# Patient Record
Sex: MALE | Race: WHITE | NOT HISPANIC OR LATINO | Employment: FULL TIME | URBAN - METROPOLITAN AREA
[De-identification: names, ages, dates, MRNs, and addresses within clinical notes are randomized per-mention and may not be internally consistent; named-entity substitution may affect disease eponyms.]

---

## 2017-05-01 ENCOUNTER — APPOINTMENT (OUTPATIENT)
Dept: RADIOLOGY | Facility: MEDICAL CENTER | Age: 32
End: 2017-05-01
Attending: EMERGENCY MEDICINE
Payer: MEDICAID

## 2017-05-01 ENCOUNTER — HOSPITAL ENCOUNTER (EMERGENCY)
Facility: MEDICAL CENTER | Age: 32
End: 2017-05-01
Attending: EMERGENCY MEDICINE
Payer: MEDICAID

## 2017-05-01 VITALS
HEIGHT: 68 IN | RESPIRATION RATE: 18 BRPM | HEART RATE: 75 BPM | OXYGEN SATURATION: 97 % | DIASTOLIC BLOOD PRESSURE: 79 MMHG | TEMPERATURE: 98 F | SYSTOLIC BLOOD PRESSURE: 116 MMHG | BODY MASS INDEX: 25.66 KG/M2 | WEIGHT: 169.31 LBS

## 2017-05-01 DIAGNOSIS — K29.70 GASTRITIS WITHOUT BLEEDING, UNSPECIFIED CHRONICITY, UNSPECIFIED GASTRITIS TYPE: ICD-10-CM

## 2017-05-01 LAB
ALBUMIN SERPL BCP-MCNC: 4.7 G/DL (ref 3.2–4.9)
ALBUMIN/GLOB SERPL: 1.4 G/DL
ALP SERPL-CCNC: 67 U/L (ref 30–99)
ALT SERPL-CCNC: 17 U/L (ref 2–50)
ANION GAP SERPL CALC-SCNC: 11 MMOL/L (ref 0–11.9)
AST SERPL-CCNC: 34 U/L (ref 12–45)
BASOPHILS # BLD AUTO: 0.3 % (ref 0–1.8)
BASOPHILS # BLD: 0.04 K/UL (ref 0–0.12)
BILIRUB SERPL-MCNC: 0.8 MG/DL (ref 0.1–1.5)
BUN SERPL-MCNC: 10 MG/DL (ref 8–22)
CALCIUM SERPL-MCNC: 9.6 MG/DL (ref 8.5–10.5)
CHLORIDE SERPL-SCNC: 105 MMOL/L (ref 96–112)
CO2 SERPL-SCNC: 24 MMOL/L (ref 20–33)
CREAT SERPL-MCNC: 0.88 MG/DL (ref 0.5–1.4)
EOSINOPHIL # BLD AUTO: 0.03 K/UL (ref 0–0.51)
EOSINOPHIL NFR BLD: 0.2 % (ref 0–6.9)
ERYTHROCYTE [DISTWIDTH] IN BLOOD BY AUTOMATED COUNT: 40.9 FL (ref 35.9–50)
GFR SERPL CREATININE-BSD FRML MDRD: >60 ML/MIN/1.73 M 2
GLOBULIN SER CALC-MCNC: 3.3 G/DL (ref 1.9–3.5)
GLUCOSE SERPL-MCNC: 95 MG/DL (ref 65–99)
HCT VFR BLD AUTO: 49.6 % (ref 42–52)
HGB BLD-MCNC: 17.5 G/DL (ref 14–18)
IMM GRANULOCYTES # BLD AUTO: 0.03 K/UL (ref 0–0.11)
IMM GRANULOCYTES NFR BLD AUTO: 0.2 % (ref 0–0.9)
LIPASE SERPL-CCNC: 23 U/L (ref 11–82)
LYMPHOCYTES # BLD AUTO: 2.51 K/UL (ref 1–4.8)
LYMPHOCYTES NFR BLD: 19.5 % (ref 22–41)
MCH RBC QN AUTO: 30.1 PG (ref 27–33)
MCHC RBC AUTO-ENTMCNC: 35.3 G/DL (ref 33.7–35.3)
MCV RBC AUTO: 85.4 FL (ref 81.4–97.8)
MONOCYTES # BLD AUTO: 0.87 K/UL (ref 0–0.85)
MONOCYTES NFR BLD AUTO: 6.8 % (ref 0–13.4)
NEUTROPHILS # BLD AUTO: 9.38 K/UL (ref 1.82–7.42)
NEUTROPHILS NFR BLD: 73 % (ref 44–72)
NRBC # BLD AUTO: 0 K/UL
NRBC BLD AUTO-RTO: 0 /100 WBC
PLATELET # BLD AUTO: 235 K/UL (ref 164–446)
PMV BLD AUTO: 10 FL (ref 9–12.9)
POTASSIUM SERPL-SCNC: 4.8 MMOL/L (ref 3.6–5.5)
PROT SERPL-MCNC: 8 G/DL (ref 6–8.2)
RBC # BLD AUTO: 5.81 M/UL (ref 4.7–6.1)
SODIUM SERPL-SCNC: 140 MMOL/L (ref 135–145)
WBC # BLD AUTO: 12.9 K/UL (ref 4.8–10.8)

## 2017-05-01 PROCEDURE — 96374 THER/PROPH/DIAG INJ IV PUSH: CPT

## 2017-05-01 PROCEDURE — 700111 HCHG RX REV CODE 636 W/ 250 OVERRIDE (IP): Performed by: EMERGENCY MEDICINE

## 2017-05-01 PROCEDURE — 85025 COMPLETE CBC W/AUTO DIFF WBC: CPT

## 2017-05-01 PROCEDURE — 74022 RADEX COMPL AQT ABD SERIES: CPT

## 2017-05-01 PROCEDURE — 99285 EMERGENCY DEPT VISIT HI MDM: CPT

## 2017-05-01 PROCEDURE — 96375 TX/PRO/DX INJ NEW DRUG ADDON: CPT

## 2017-05-01 PROCEDURE — A9270 NON-COVERED ITEM OR SERVICE: HCPCS | Performed by: EMERGENCY MEDICINE

## 2017-05-01 PROCEDURE — 96376 TX/PRO/DX INJ SAME DRUG ADON: CPT

## 2017-05-01 PROCEDURE — 700102 HCHG RX REV CODE 250 W/ 637 OVERRIDE(OP): Performed by: EMERGENCY MEDICINE

## 2017-05-01 PROCEDURE — 80053 COMPREHEN METABOLIC PANEL: CPT

## 2017-05-01 PROCEDURE — 83690 ASSAY OF LIPASE: CPT

## 2017-05-01 RX ORDER — OMEPRAZOLE 20 MG/1
20 CAPSULE, DELAYED RELEASE ORAL DAILY
Qty: 30 CAP | Refills: 0 | Status: SHIPPED | OUTPATIENT
Start: 2017-05-01 | End: 2017-11-04

## 2017-05-01 RX ORDER — OMEPRAZOLE 20 MG/1
20 CAPSULE, DELAYED RELEASE ORAL 2 TIMES DAILY
Qty: 60 CAP | Refills: 0 | Status: SHIPPED | OUTPATIENT
Start: 2017-05-01 | End: 2017-05-01

## 2017-05-01 RX ORDER — MORPHINE SULFATE 4 MG/ML
2 INJECTION, SOLUTION INTRAMUSCULAR; INTRAVENOUS ONCE
Status: COMPLETED | OUTPATIENT
Start: 2017-05-01 | End: 2017-05-01

## 2017-05-01 RX ORDER — MORPHINE SULFATE 4 MG/ML
4 INJECTION, SOLUTION INTRAMUSCULAR; INTRAVENOUS ONCE
Status: COMPLETED | OUTPATIENT
Start: 2017-05-01 | End: 2017-05-01

## 2017-05-01 RX ORDER — ONDANSETRON 2 MG/ML
4 INJECTION INTRAMUSCULAR; INTRAVENOUS ONCE
Status: COMPLETED | OUTPATIENT
Start: 2017-05-01 | End: 2017-05-01

## 2017-05-01 RX ORDER — SUCRALFATE 1 G/1
1 TABLET ORAL
Qty: 120 TAB | Refills: 0 | Status: SHIPPED | OUTPATIENT
Start: 2017-05-01 | End: 2017-05-31

## 2017-05-01 RX ADMIN — MORPHINE SULFATE 4 MG: 4 INJECTION INTRAVENOUS at 20:26

## 2017-05-01 RX ADMIN — ONDANSETRON 4 MG: 2 INJECTION INTRAMUSCULAR; INTRAVENOUS at 20:26

## 2017-05-01 RX ADMIN — MORPHINE SULFATE 2 MG: 4 INJECTION INTRAVENOUS at 21:52

## 2017-05-01 RX ADMIN — LIDOCAINE HYDROCHLORIDE 30 ML: 20 SOLUTION OROPHARYNGEAL at 21:28

## 2017-05-01 ASSESSMENT — PAIN SCALES - GENERAL: PAINLEVEL_OUTOF10: 8

## 2017-05-01 NOTE — ED AVS SNAPSHOT
[employer] Access Code: [email or web address]  Expires: 5/31/2017 10:01 PM    [employer]  A secure, online tool to manage your health information     [employer]® is a secure, online tool that connects you to your personalized health information from the privacy of your home -- day or night - making it very easy for you to manage your healthcare. Once the activation process is completed, you can even access your medical information using the [employer] tha, which is available for free in the Apple Tha store or Google Play store.     [employer] provides the following levels of access (as shown below):   My Chart Features   [facility] Primary Care Doctor [facility]  Specialists [facility]  Urgent  Care Non-[facility]  Primary Care  Doctor   Email your healthcare team securely and privately 24/7 X X X X   Manage appointments: schedule your next appointment; view details of past/upcoming appointments X      Request prescription refills. X      View recent personal medical records, including lab and immunizations X X X X   View health record, including health history, allergies, medications X X X X   Read reports about your outpatient visits, procedures, consult and ER notes X X X X   See your discharge summary, which is a recap of your hospital and/or ER visit that includes your diagnosis, lab results, and care plan. X X       How to register for [employer]  1. Go to  [email or web address].  2. Click on the Sign Up Now box, which takes you to the New Member Sign Up page. You will need to provide the following information:  a. Enter your [employer] Access Code exactly as it appears at the top of this page. (You will not need to use this code after you’ve completed the sign-up process. If you do not sign up before the expiration date, you must request a new code.)   b. Enter your date of birth.   c. Enter your home email address.   d. Click Submit, and follow the next screen’s instructions.  3. Create a [employer] ID. This will be your [employer]  login ID and cannot be changed, so think of one that is secure and easy to remember.  4. Create a [employer] password. You can change your password at any time.  5. Enter your Password Reset Question and Answer. This can be used at a later time if you forget your password.   6. Enter your e-mail address. This allows you to receive e-mail notifications when new information is available in [employer].  7. Click Sign Up. You can now view your health information.    For assistance activating your [employer] account, call [phone]

## 2017-05-01 NOTE — ED AVS SNAPSHOT
5/1/2017    Bryant Mix  [address]  [place] [address]    Dear Bryant:    [facility] wants to ensure your discharge home is safe and you or your loved ones have had all of your questions answered regarding your care after you leave the hospital.    Below is a list of resources and contact information should you have any questions regarding your hospital stay, follow-up instructions, or active medical symptoms.    Questions or Concerns Regarding… Contact   Medical Questions Related to Your Discharge  (7 days a week, 8am-5pm) Contact a Nurse Care Coordinator   [phone]   Medical Questions Not Related to Your Discharge  (24 hours a day / 7 days a week)  Contact the Nurse Health Line   [phone]    Medications or Discharge Instructions Refer to your discharge packet   or contact your [facility] Primary Care Provider   [phone]   Follow-up Appointment(s) Schedule your appointment via [employer]Smart   or contact Scheduling [phone]   Billing Review your statement via [employer]  or contact Billing [phone]   Medical Records Review your records via [employer]   or contact Medical Records [phone]     You may receive a telephone call within two days of discharge. This call is to make certain you understand your discharge instructions and have the opportunity to have any questions answered. You can also easily access your medical information, test results and upcoming appointments via the [employer] free online health management tool. You can learn more and sign up at [email or web address]rSmart. For assistance setting up your [employer] account, please call [phone].    Once again, we want to ensure your discharge home is safe and that you have a clear understanding of any next steps in your care. If you have any questions or concerns, please do not hesitate to contact us, we are here for you. Thank you for choosing [facility] for your healthcare needs.    Sincerely,    Your [facility] Healthcare Team

## 2017-05-01 NOTE — ED AVS SNAPSHOT
Home Care Instructions                                                                                                                Bryant Mix   MRN: [id number]    Department:  [facility], Emergency Dept   Date of Visit:  5/1/2017            [facility] Emergency Dept    [address] [facility] [address]    Phone:  [phone]      You were seen by     Jackson Ortez M.D.      Your Diagnosis Was     Gastritis without bleeding, unspecified chronicity, unspecified gastritis type     K29.70       These are the medications you received during your hospitalization from 05/01/2017 1633 to 05/01/2017 2201     Date/Time Order Dose Route Action    05/01/2017 2026 morphine (pf) 4 mg/ml injection 4 mg 4 mg Intravenous Given    05/01/2017 2026 ondansetron (ZOFRAN) syringe/vial injection 4 mg 4 mg Intravenous Given    05/01/2017 2128 hyoscyamine-maalox plus-lidocaine viscous (GI COCKTAIL) oral susp 30 mL 30 mL Oral Given    05/01/2017 2152 morphine (pf) 4 mg/ml injection 2 mg 2 mg Intravenous Given      Follow-up Information     1. Schedule an appointment as soon as possible for a visit with [facility].    Contact information    [address] [place] [address]  [phone]        2. Schedule an appointment as soon as possible for a visit with [facility].    Contact information    [facility] [address] [phone]          3. Follow up with [facility], Emergency Dept.    Specialty:  Emergency Medicine    Why:  in 12-24 hours if symptoms persist,, immediately if symptoms worsen    Contact information    [address] [place] [address] [phone]      Medication Information     Review all of your home medications and newly ordered medications with your primary doctor and/or pharmacist as soon as possible. Follow medication instructions as directed by your doctor and/or pharmacist.     Please keep your  complete medication list with you and share with your physician. Update the information when medications are discontinued, doses are changed, or new medications (including over-the-counter products) are added; and carry medication information at all times in the event of emergency situations.               Medication List      START taking these medications        Instructions    Morning Afternoon Evening Bedtime    omeprazole 20 MG delayed-release capsule   Commonly known as:  PRILOSEC        Take 1 Cap by mouth every day.   Dose:  20 mg                        sucralfate 1 GM Tabs   Commonly known as:  CARAFATE        Take 1 Tab by mouth 4 Times a Day,Before Meals and at Bedtime for 30 days.   Dose:  1 g                          ASK your doctor about these medications        Instructions    Morning Afternoon Evening Bedtime    albuterol 108 (90 BASE) MCG/ACT Aers inhalation aerosol        Inhale 2 Puffs by mouth every 6 hours as needed for Shortness of Breath.   Dose:  2 Puff                        cyclobenzaprine 5 MG tablet   Commonly known as:  FLEXERIL        Take 1-2 Tabs by mouth 3 times a day as needed for Muscle Spasms.   Dose:  5-10 mg                        gabapentin 300 MG Caps   Commonly known as:  NEURONTIN        Take 1 Cap by mouth 3 times a day.   Dose:  300 mg                        oxycodone-acetaminophen 5-325 MG Tabs   Commonly known as:  PERCOCET        Take 1-2 Tabs by mouth every 6 hours as needed for Moderate Pain (pain).   Dose:  1-2 Tab                        phenytoin  MG Caps   Commonly known as:  DILANTIN        Take 1 Cap by mouth 3 times a day.   Dose:  100 mg                        tramadol 50 MG Tabs   Commonly known as:  ULTRAM        Take 1-2 Tabs by mouth every 8 hours as needed.   Dose:   mg                             Where to Get Your Medications      You can get these medications from any pharmacy     Bring a paper prescription for each of these medications    -  omeprazole 20 MG delayed-release capsule  - sucralfate 1 GM Tabs            Procedures and tests performed during your visit     CARDIAC MONITORING    CBC WITH DIFFERENTIAL    COMP METABOLIC PANEL    DX-ABDOMEN COMPLETE WITH AP OR PA CXR    ESTIMATED GFR    LIPASE    O2 Protocol    SALINE LOCK        Discharge Instructions       Gastritis, Adult  Gastritis is soreness and swelling (inflammation) of the lining of the stomach. Gastritis can develop as a sudden onset (acute) or long-term (chronic) condition. If gastritis is not treated, it can lead to stomach bleeding and ulcers.  CAUSES   Gastritis occurs when the stomach lining is weak or damaged. Digestive juices from the stomach then inflame the weakened stomach lining. The stomach lining may be weak or damaged due to viral or bacterial infections. One common bacterial infection is the Helicobacter pylori infection. Gastritis can also result from excessive alcohol consumption, taking certain medicines, or having too much acid in the stomach.   SYMPTOMS   In some cases, there are no symptoms. When symptoms are present, they may include:  · Pain or a burning sensation in the upper abdomen.  · Nausea.  · Vomiting.  · An uncomfortable feeling of fullness after eating.  DIAGNOSIS   Your caregiver may suspect you have gastritis based on your symptoms and a physical exam. To determine the cause of your gastritis, your caregiver may perform the following:  · Blood or stool tests to check for the H pylori bacterium.  · Gastroscopy. A thin, flexible tube (endoscope) is passed down the esophagus and into the stomach. The endoscope has a light and camera on the end. Your caregiver uses the endoscope to view the inside of the stomach.  · Taking a tissue sample (biopsy) from the stomach to examine under a microscope.  TREATMENT   Depending on the cause of your gastritis, medicines may be prescribed. If you have a bacterial infection, such as an H pylori infection, antibiotics  may be given. If your gastritis is caused by too much acid in the stomach, H2 blockers or antacids may be given. Your caregiver may recommend that you stop taking aspirin, ibuprofen, or other nonsteroidal anti-inflammatory drugs (NSAIDs).  HOME CARE INSTRUCTIONS  · Only take over-the-counter or prescription medicines as directed by your caregiver.  · If you were given antibiotic medicines, take them as directed. Finish them even if you start to feel better.  · Drink enough fluids to keep your urine clear or pale yellow.  · Avoid foods and drinks that make your symptoms worse, such as:  1. Caffeine or alcoholic drinks.  2. Chocolate.  3. Peppermint or mint flavorings.  4. Garlic and onions.  5. Spicy foods.  6. Citrus fruits, such as oranges, hans, or limes.  7. Tomato-based foods such as sauce, chili, salsa, and pizza.  8. Fried and fatty foods.  · Eat small, frequent meals instead of large meals.  SEEK IMMEDIATE MEDICAL CARE IF:   · You have black or dark red stools.  · You vomit blood or material that looks like coffee grounds.  · You are unable to keep fluids down.  · Your abdominal pain gets worse.  · You have a fever.  · You do not feel better after 1 week.  · You have any other questions or concerns.  MAKE SURE YOU:  · Understand these instructions.  · Will watch your condition.  · Will get help right away if you are not doing well or get worse.     This information is not intended to replace advice given to you by your health care provider. Make sure you discuss any questions you have with your health care provider.     Document Released: 12/12/2002 Document Revised: 06/18/2013 Document Reviewed: 01/30/2013  [employer] Interactive Patient Education ©2016 [employer]  Abdominal Pain (Nonspecific)  Your exam might not show the exact reason you have abdominal pain. Since there are many different causes of abdominal pain, another checkup and more tests may be needed. It is very important to follow up for  lasting (persistent) or worsening symptoms. A possible cause of abdominal pain in any person who still has his or her appendix is acute appendicitis. Appendicitis is often hard to diagnose. Normal blood tests, urine tests, ultrasound, and CT scans do not completely rule out early appendicitis or other causes of abdominal pain. Sometimes, only the changes that happen over time will allow appendicitis and other causes of abdominal pain to be determined. Other potential problems that may require surgery may also take time to become more apparent. Because of this, it is important that you follow all of the instructions below.  HOME CARE INSTRUCTIONS   · Rest as much as possible.   · Do not eat solid food until your pain is gone.   · While adults or children have pain: A diet of water, weak decaffeinated tea, broth or bouillon, gelatin, oral rehydration solutions (ORS), frozen ice pops, or ice chips may be helpful.   · When pain is gone in adults or children: Start a light diet (dry toast, crackers, applesauce, or white rice). Increase the diet slowly as long as it does not bother you. Eat no dairy products (including cheese and eggs) and no spicy, fatty, fried, or high-fiber foods.   · Use no alcohol, caffeine, or cigarettes.   · Take your regular medicines unless your caregiver told you not to.   · Take any prescribed medicine as directed.   · Only take over-the-counter or prescription medicines for pain, discomfort, or fever as directed by your caregiver. Do not give aspirin to children.   If your caregiver has given you a follow-up appointment, it is very important to keep that appointment. Not keeping the appointment could result in a permanent injury and/or lasting (chronic) pain and/or disability. If there is any problem keeping the appointment, you must call to reschedule.   SEEK IMMEDIATE MEDICAL CARE IF:   · Your pain is not gone in 24 hours.   · Your pain becomes worse, changes location, or feels different.      · You or your child has an oral temperature above 102° F (38.9° C), not controlled by medicine.   · Your baby is older than 3 months with a rectal temperature of 102° F (38.9° C) or higher.   · Your baby is 3 months old or younger with a rectal temperature of 100.4° F (38° C) or higher.   · You have shaking chills.   · You keep throwing up (vomiting) or cannot drink liquids.   · There is blood in your vomit or you see blood in your bowel movements.   · Your bowel movements become dark or black.   · You have frequent bowel movements.   · Your bowel movements stop (become blocked) or you cannot pass gas.   · You have bloody, frequent, or painful urination.   · You have yellow discoloration in the skin or whites of the eyes.   · Your stomach becomes bloated or bigger.   · You have dizziness or fainting.   · You have chest or back pain.   MAKE SURE YOU:   · Understand these instructions.   · Will watch your condition.   · Will get help right away if you are not doing well or get worse.   Document Released: 12/18/2006 Document Revised: 03/11/2013 Document Reviewed: 11/15/2010  [employer]® Patient Information ©2013 [employer].          Patient Information     Patient Information    Following emergency treatment: all patient requiring follow-up care must return either to a private physician or a clinic if your condition worsens before you are able to obtain further medical attention, please return to the emergency room.     Billing Information    At [facility], we work to make the billing process streamlined for our patients.  Our Representatives are here to answer any questions you may have regarding your hospital bill.  If you have insurance coverage and have supplied your insurance information to us, we will submit a claim to your insurer on your behalf.  Should you have any questions regarding your bill, we can be reached online or by phone as follows:  Online: You are able pay your bills online or live chat with  our representatives about any billing questions you may have. We are here to help Monday - Friday from 8:00am to 7:30pm and 9:00am - 12:00pm on Saturdays.  Please visit [email or web address]  for more information.   Phone:  [phone] or [phone]    Please note that your emergency physician, surgeon, pathologist, radiologist, anesthesiologist, and other specialists are not employed by [facility] and will therefore bill separately for their services.  Please contact them directly for any questions concerning their bills at the numbers below:     [facility] Physician Services:  [phone]  [facility]:  [phone]  [facility]:  [phone]  [facility]:  [phone]    1. Your final bill may vary from the amount quoted upon discharge if all procedures are not complete at that time, or if your doctor has additional procedures of which we are not aware. You will receive an additional bill if you return to the Emergency Department at [facility] for suture removal regardless of the facility of which the sutures were placed.     2. Please arrange for settlement of this account at the emergency registration.    3. All self-pay accounts are due in full at the time of treatment.  If you are unable to meet this obligation then payment is expected within 4-5 days.     4. If you have had radiology studies (CT, X-ray, Ultrasound, MRI), you have received a preliminary result during your emergency department visit. Please contact the radiology department [phone] to receive a copy of your final result. Please discuss the Final result with your primary physician or with the follow up physician provided.     Crisis Hotline:  [place] Crisis Hotline:  1-262-XLAMMTV or 1-947.490.3680  [place] Crisis Hotline:    1-171.360.6923 or 703-588-4647         ED Discharge Follow Up Questions    1. In order to provide you with very good care, we  would like to follow up with a phone call in the next few days.  May we have your permission to contact you?     YES /  NO    2. What is the best phone number to call you? (       )_____-__________    3. What is the best time to call you?      Morning  /  Afternoon  /  Evening                   Patient Signature:  ____________________________________________________________    Date:  ____________________________________________________________

## 2017-05-02 NOTE — ED PROVIDER NOTES
"ED Provider Note    Scribed for Jackson Ortez M.D. by Belkys Cho. 5/1/2017, 8:10 PM.    Primary care provider: Luther Linton M.D.  Means of arrival: Walk-in  History obtained from: Patient  History limited by: None    CHIEF COMPLAINT  No chief complaint on file.      HPI  Bryant Mix is a 31 y.o. male who presents to the Emergency Department with epigastric pain for three days. Patient states his pain became severe at 12:00 PM today and has been progressively worsening throughout the day. He describes his pain as \"something pulling my stomach\" and he reports associated nausea. Patient had an episode of emesis after arriving to the emergency department. He denies diarrhea but states his stool has been soft and yellowish in color. Patient denies fever, chills, constipation, blood in stool or urine, or other symptoms. He has a history of gastric ulcers and seizures.     REVIEW OF SYSTEMS  Pertinent positives include epigastric pain, nausea, vomiting. Pertinent negatives include no diarrhea, fever, chills,  constipation, blood in stool or urine. Otherwise ten systems reviewed and otherwise negative. C.    PAST MEDICAL HISTORY   has a past medical history of Psychiatric disorder; Hypoglycemia; Lactose intolerance; Seizure disorder (CMS-[facility]); and Spina bifida (CMS-HCC).    SURGICAL HISTORY   has past surgical history that includes other; tonsillectomy; and frenulum clipping.    SOCIAL HISTORY  Social History   Substance Use Topics   • Smoking status: Current Every Day Smoker -- 0.25 packs/day for 6 years     Types: Cigarettes   • Smokeless tobacco: Current User     Types: Chew   • Alcohol Use: Yes      Comment: socially      History   Drug Use   • Yes     Comment: marijuana       FAMILY HISTORY  Non-Contributory    CURRENT MEDICATIONS  Reviewed.  See Encounter Summary.     ALLERGIES  Allergies   Allergen Reactions   • Vicodin [Hydrocodone-Acetaminophen] Rash   • Aspirin Hives       PHYSICAL " "EXAM  VITAL SIGNS: /79 mmHg  Pulse 72  Temp(Src) 36.7 °C (98 °F) (Temporal)  Resp 18  Ht 1.727 m (5' 7.99\")  Wt 76.8 kg (169 lb 5 oz)  BMI 25.75 kg/m2  SpO2 97%  Pulse ox interpretation: I interpret this pulse ox as normal.  Constitutional: Alert and oriented x 3, minimal distress  HEENT: Atraumatic normocephalic, pupils are equal round reactive to light extraocular movements are intact. The nares is clear, external ears are normal, mouth shows moist mucous membranes  Neck: Supple, no JVD no tracheal deviation  Cardiovascular: Regular rate and rhythm no murmur rub or gallop 2+ pulses peripherally x4  Thorax & Lungs: No respiratory distress, no wheezes rales or rhonchi, No chest tenderness.   GI: Tenderness in mid epigastrium, No guarding, no rebound, Soft, nondistended positive bowel sounds, no peritoneal signs  Skin: Warm dry no acute rash or lesion  Musculoskeletal: Moving all extremities with full range and 5 of 5 strength, no acute deformity  Neurologic: Cranial nerves III through XII are grossly intact, no sensory deficit, no cerebellar dysfunction   Psychiatric: Appropriate affect for situation at this time      DIAGNOSTIC STUDIES / PROCEDURES  LABS  Results for orders placed or performed during the hospital encounter of 05/01/17   CBC WITH DIFFERENTIAL   Result Value Ref Range    WBC 12.9 (H) 4.8 - 10.8 K/uL    RBC 5.81 4.70 - 6.10 M/uL    Hemoglobin 17.5 14.0 - 18.0 g/dL    Hematocrit 49.6 42.0 - 52.0 %    MCV 85.4 81.4 - 97.8 fL    MCH 30.1 27.0 - 33.0 pg    MCHC 35.3 33.7 - 35.3 g/dL    RDW 40.9 35.9 - 50.0 fL    Platelet Count 235 164 - 446 K/uL    MPV 10.0 9.0 - 12.9 fL    Neutrophils-Polys 73.00 (H) 44.00 - 72.00 %    Lymphocytes 19.50 (L) 22.00 - 41.00 %    Monocytes 6.80 0.00 - 13.40 %    Eosinophils 0.20 0.00 - 6.90 %    Basophils 0.30 0.00 - 1.80 %    Immature Granulocytes 0.20 0.00 - 0.90 %    Nucleated RBC 0.00 /100 WBC    Neutrophils (Absolute) 9.38 (H) 1.82 - 7.42 K/uL    Lymphs " (Absolute) 2.51 1.00 - 4.80 K/uL    Monos (Absolute) 0.87 (H) 0.00 - 0.85 K/uL    Eos (Absolute) 0.03 0.00 - 0.51 K/uL    Baso (Absolute) 0.04 0.00 - 0.12 K/uL    Immature Granulocytes (abs) 0.03 0.00 - 0.11 K/uL    NRBC (Absolute) 0.00 K/uL   COMP METABOLIC PANEL   Result Value Ref Range    Sodium 140 135 - 145 mmol/L    Potassium 4.8 3.6 - 5.5 mmol/L    Chloride 105 96 - 112 mmol/L    Co2 24 20 - 33 mmol/L    Anion Gap 11.0 0.0 - 11.9    Glucose 95 65 - 99 mg/dL    Bun 10 8 - 22 mg/dL    Creatinine 0.88 0.50 - 1.40 mg/dL    Calcium 9.6 8.5 - 10.5 mg/dL    AST(SGOT) 34 12 - 45 U/L    ALT(SGPT) 17 2 - 50 U/L    Alkaline Phosphatase 67 30 - 99 U/L    Total Bilirubin 0.8 0.1 - 1.5 mg/dL    Albumin 4.7 3.2 - 4.9 g/dL    Total Protein 8.0 6.0 - 8.2 g/dL    Globulin 3.3 1.9 - 3.5 g/dL    A-G Ratio 1.4 g/dL   LIPASE   Result Value Ref Range    Lipase 23 11 - 82 U/L   ESTIMATED GFR   Result Value Ref Range    GFR If African American >60 >60 mL/min/1.73 m 2    GFR If Non African American >60 >60 mL/min/1.73 m 2      All labs reviewed by me.    RADIOLOGY  DX-ABDOMEN COMPLETE WITH AP OR PA CXR   Preliminary Result        The radiologist's interpretation of all radiological studies have been reviewed by me.    COURSE & MEDICAL DECISION MAKING  Pertinent Labs & Imaging studies reviewed. (See chart for details)    8:10 PM - Patient seen and examined at bedside. I explained to the patient I will order labs and imaging to evaluate, and that his symptoms will be treated. We discussed his symptoms could be due to pancreatitis or his history of gastric ulcer. Patient will be treated with Morphine 4 mg IV and Zofran 4 mg IV. Ordered DX-abdomen, CBC, CMP, lipase to evaluate his symptoms.     9:42 PM Recheck: Patient is resting comfortably and reports feeling slightly improved after medication. I updated him on his results, which were unremarkable other than a slightly elevated WBC. We discussed he likely has gastritis, and that he is  now stable for discharge with a prescription for Carafate and Prilosec. I advised him to follow up with his primary care provider and to return to the ED for high fever, worsening vomiting, blood in vomit, or other medical concerns. He understands and will comply.        The patient will return for new or worsening symptoms and is stable at the time of discharge.    The patient is referred to a primary physician for blood pressure management, diabetic screening, and for all other preventative health concerns.    DISPOSITION:  Patient will be discharged home in stable condition.    FOLLOW UP:  [facility]  [address] [address]  [phone]  Schedule an appointment as soon as possible for a visit      [facility]  [facility] [address]  [phone]    Schedule an appointment as soon as possible for a visit      [facility], Emergency Dept  [address] [facility] [id number] [phone]    in 12-24 hours if symptoms persist,, immediately if symptoms worsen      OUTPATIENT MEDICATIONS:  New Prescriptions    OMEPRAZOLE (PRILOSEC) 20 MG DELAYED-RELEASE CAPSULE    Take 1 Cap by mouth every day.    SUCRALFATE (CARAFATE) 1 GM TAB    Take 1 Tab by mouth 4 Times a Day,Before Meals and at Bedtime for 30 days.       FINAL IMPRESSION  1. Gastritis without bleeding, unspecified chronicity, unspecified gastritis type           This dictation has been created using voice recognition software and/or scribes. The accuracy of the dictation is limited by the abilities of the software and the expertise of the scribes. I expect there may be some errors of grammar and possibly content. I made every attempt to manually correct the errors within my dictation. However, errors related to voice recognition software and/or scribes may still exist and should be interpreted within the appropriate context.     I, Belkys Cho (Scribe), am scribing for, and in the presence of,  Jackson Ortez M.D..    Electronically signed by: Belkys Cho (Scribe), 5/1/2017    I, Jackson Ortez M.D. personally performed the services described in this documentation, as scribed by Belkys Cho in my presence, and it is both accurate and complete.    The note accurately reflects work and decisions made by me.  Jackson Ortez  5/1/2017  11:58 PM

## 2017-05-02 NOTE — ED NOTES
Patient to follow up with PCP and GI in the morning. Patient verbalizes understanding of discharge instructions and home medications. Patient ambulatory upon discharge.

## 2017-05-02 NOTE — DISCHARGE INSTRUCTIONS
Gastritis, Adult  Gastritis is soreness and swelling (inflammation) of the lining of the stomach. Gastritis can develop as a sudden onset (acute) or long-term (chronic) condition. If gastritis is not treated, it can lead to stomach bleeding and ulcers.  CAUSES   Gastritis occurs when the stomach lining is weak or damaged. Digestive juices from the stomach then inflame the weakened stomach lining. The stomach lining may be weak or damaged due to viral or bacterial infections. One common bacterial infection is the Helicobacter pylori infection. Gastritis can also result from excessive alcohol consumption, taking certain medicines, or having too much acid in the stomach.   SYMPTOMS   In some cases, there are no symptoms. When symptoms are present, they may include:  · Pain or a burning sensation in the upper abdomen.  · Nausea.  · Vomiting.  · An uncomfortable feeling of fullness after eating.  DIAGNOSIS   Your caregiver may suspect you have gastritis based on your symptoms and a physical exam. To determine the cause of your gastritis, your caregiver may perform the following:  · Blood or stool tests to check for the H pylori bacterium.  · Gastroscopy. A thin, flexible tube (endoscope) is passed down the esophagus and into the stomach. The endoscope has a light and camera on the end. Your caregiver uses the endoscope to view the inside of the stomach.  · Taking a tissue sample (biopsy) from the stomach to examine under a microscope.  TREATMENT   Depending on the cause of your gastritis, medicines may be prescribed. If you have a bacterial infection, such as an H pylori infection, antibiotics may be given. If your gastritis is caused by too much acid in the stomach, H2 blockers or antacids may be given. Your caregiver may recommend that you stop taking aspirin, ibuprofen, or other nonsteroidal anti-inflammatory drugs (NSAIDs).  HOME CARE INSTRUCTIONS  · Only take over-the-counter or prescription medicines as directed by  your caregiver.  · If you were given antibiotic medicines, take them as directed. Finish them even if you start to feel better.  · Drink enough fluids to keep your urine clear or pale yellow.  · Avoid foods and drinks that make your symptoms worse, such as:  1. Caffeine or alcoholic drinks.  2. Chocolate.  3. Peppermint or mint flavorings.  4. Garlic and onions.  5. Spicy foods.  6. Citrus fruits, such as oranges, hans, or limes.  7. Tomato-based foods such as sauce, chili, salsa, and pizza.  8. Fried and fatty foods.  · Eat small, frequent meals instead of large meals.  SEEK IMMEDIATE MEDICAL CARE IF:   · You have black or dark red stools.  · You vomit blood or material that looks like coffee grounds.  · You are unable to keep fluids down.  · Your abdominal pain gets worse.  · You have a fever.  · You do not feel better after 1 week.  · You have any other questions or concerns.  MAKE SURE YOU:  · Understand these instructions.  · Will watch your condition.  · Will get help right away if you are not doing well or get worse.     This information is not intended to replace advice given to you by your health care provider. Make sure you discuss any questions you have with your health care provider.     Document Released: 12/12/2002 Document Revised: 06/18/2013 Document Reviewed: 01/30/2013  [employer] Interactive Patient Education ©2016 [employer]  Abdominal Pain (Nonspecific)  Your exam might not show the exact reason you have abdominal pain. Since there are many different causes of abdominal pain, another checkup and more tests may be needed. It is very important to follow up for lasting (persistent) or worsening symptoms. A possible cause of abdominal pain in any person who still has his or her appendix is acute appendicitis. Appendicitis is often hard to diagnose. Normal blood tests, urine tests, ultrasound, and CT scans do not completely rule out early appendicitis or other causes of abdominal pain. Sometimes,  only the changes that happen over time will allow appendicitis and other causes of abdominal pain to be determined. Other potential problems that may require surgery may also take time to become more apparent. Because of this, it is important that you follow all of the instructions below.  HOME CARE INSTRUCTIONS   · Rest as much as possible.   · Do not eat solid food until your pain is gone.   · While adults or children have pain: A diet of water, weak decaffeinated tea, broth or bouillon, gelatin, oral rehydration solutions (ORS), frozen ice pops, or ice chips may be helpful.   · When pain is gone in adults or children: Start a light diet (dry toast, crackers, applesauce, or white rice). Increase the diet slowly as long as it does not bother you. Eat no dairy products (including cheese and eggs) and no spicy, fatty, fried, or high-fiber foods.   · Use no alcohol, caffeine, or cigarettes.   · Take your regular medicines unless your caregiver told you not to.   · Take any prescribed medicine as directed.   · Only take over-the-counter or prescription medicines for pain, discomfort, or fever as directed by your caregiver. Do not give aspirin to children.   If your caregiver has given you a follow-up appointment, it is very important to keep that appointment. Not keeping the appointment could result in a permanent injury and/or lasting (chronic) pain and/or disability. If there is any problem keeping the appointment, you must call to reschedule.   SEEK IMMEDIATE MEDICAL CARE IF:   · Your pain is not gone in 24 hours.   · Your pain becomes worse, changes location, or feels different.   · You or your child has an oral temperature above 102° F (38.9° C), not controlled by medicine.   · Your baby is older than 3 months with a rectal temperature of 102° F (38.9° C) or higher.   · Your baby is 3 months old or younger with a rectal temperature of 100.4° F (38° C) or higher.   · You have shaking chills.   · You keep throwing  up (vomiting) or cannot drink liquids.   · There is blood in your vomit or you see blood in your bowel movements.   · Your bowel movements become dark or black.   · You have frequent bowel movements.   · Your bowel movements stop (become blocked) or you cannot pass gas.   · You have bloody, frequent, or painful urination.   · You have yellow discoloration in the skin or whites of the eyes.   · Your stomach becomes bloated or bigger.   · You have dizziness or fainting.   · You have chest or back pain.   MAKE SURE YOU:   · Understand these instructions.   · Will watch your condition.   · Will get help right away if you are not doing well or get worse.   Document Released: 12/18/2006 Document Revised: 03/11/2013 Document Reviewed: 11/15/2010  [employer]® Patient Information ©2013 [employer].

## 2017-11-03 ENCOUNTER — HOSPITAL ENCOUNTER (EMERGENCY)
Facility: MEDICAL CENTER | Age: 32
End: 2017-11-04
Attending: EMERGENCY MEDICINE
Payer: MEDICAID

## 2017-11-03 DIAGNOSIS — B34.9 VIRAL SYNDROME: ICD-10-CM

## 2017-11-03 PROCEDURE — 99283 EMERGENCY DEPT VISIT LOW MDM: CPT

## 2017-11-04 ENCOUNTER — APPOINTMENT (OUTPATIENT)
Dept: RADIOLOGY | Facility: MEDICAL CENTER | Age: 32
End: 2017-11-04
Attending: EMERGENCY MEDICINE
Payer: MEDICAID

## 2017-11-04 VITALS
SYSTOLIC BLOOD PRESSURE: 105 MMHG | WEIGHT: 168.87 LBS | HEIGHT: 68 IN | DIASTOLIC BLOOD PRESSURE: 53 MMHG | OXYGEN SATURATION: 96 % | TEMPERATURE: 96.6 F | HEART RATE: 67 BPM | RESPIRATION RATE: 14 BRPM | BODY MASS INDEX: 25.59 KG/M2

## 2017-11-04 PROCEDURE — 700102 HCHG RX REV CODE 250 W/ 637 OVERRIDE(OP): Performed by: EMERGENCY MEDICINE

## 2017-11-04 PROCEDURE — 71020 DX-CHEST-2 VIEWS: CPT

## 2017-11-04 PROCEDURE — A9270 NON-COVERED ITEM OR SERVICE: HCPCS | Performed by: EMERGENCY MEDICINE

## 2017-11-04 RX ORDER — ACETAMINOPHEN 325 MG/1
650 TABLET ORAL ONCE
Status: COMPLETED | OUTPATIENT
Start: 2017-11-04 | End: 2017-11-04

## 2017-11-04 RX ADMIN — ACETAMINOPHEN 650 MG: 325 TABLET, FILM COATED ORAL at 01:01

## 2017-11-04 ASSESSMENT — ENCOUNTER SYMPTOMS
SHORTNESS OF BREATH: 0
SORE THROAT: 1
ABDOMINAL PAIN: 0
COUGH: 1
FEVER: 1
WHEEZING: 0
STRIDOR: 0
DIZZINESS: 0

## 2017-11-04 ASSESSMENT — PAIN SCALES - GENERAL: PAINLEVEL_OUTOF10: 4

## 2017-11-04 NOTE — DISCHARGE INSTRUCTIONS
"Viral Infections  A viral infection can be caused by different types of viruses. Most viral infections are not serious and resolve on their own. However, some infections may cause severe symptoms and may lead to further complications.  SYMPTOMS  Viruses can frequently cause:  · Minor sore throat.  · Aches and pains.  · Headaches.  · Runny nose.  · Different types of rashes.  · Watery eyes.  · Tiredness.  · Cough.  · Loss of appetite.  · Gastrointestinal infections, resulting in nausea, vomiting, and diarrhea.  These symptoms do not respond to antibiotics because the infection is not caused by bacteria. However, you might catch a bacterial infection following the viral infection. This is sometimes called a \"superinfection.\" Symptoms of such a bacterial infection may include:  · Worsening sore throat with pus and difficulty swallowing.  · Swollen neck glands.  · Chills and a high or persistent fever.  · Severe headache.  · Tenderness over the sinuses.  · Persistent overall ill feeling (malaise), muscle aches, and tiredness (fatigue).  · Persistent cough.  · Yellow, green, or brown mucus production with coughing.  HOME CARE INSTRUCTIONS   · Only take over-the-counter or prescription medicines for pain, discomfort, diarrhea, or fever as directed by your caregiver.  · Drink enough water and fluids to keep your urine clear or pale yellow. Sports drinks can provide valuable electrolytes, sugars, and hydration.  · Get plenty of rest and maintain proper nutrition. Soups and broths with crackers or rice are fine.  SEEK IMMEDIATE MEDICAL CARE IF:   · You have severe headaches, shortness of breath, chest pain, neck pain, or an unusual rash.  · You have uncontrolled vomiting, diarrhea, or you are unable to keep down fluids.  · You or your child has an oral temperature above 102° F (38.9° C), not controlled by medicine.  · Your baby is older than 3 months with a rectal temperature of 102° F (38.9° C) or higher.  · Your baby is 3 " months old or younger with a rectal temperature of 100.4° F (38° C) or higher.  MAKE SURE YOU:   · Understand these instructions.  · Will watch your condition.  · Will get help right away if you are not doing well or get worse.     This information is not intended to replace advice given to you by your health care provider. Make sure you discuss any questions you have with your health care provider.     Document Released: 09/27/2006 Document Revised: 03/11/2013 Document Reviewed: 04/23/2012  [employer] Interactive Patient Education ©2016 [employer]

## 2017-11-04 NOTE — ED NOTES
"Triage Room     Pt c/o chest congestion and cough x 1 week that doesn't seem to get better, pt states he has hx of URI     .  Chief Complaint   Patient presents with   • Congestion     x 1 week    • Cough     x 1 week    • Fever     off and on for last week never took tempature just off and on sweating     ./70   Pulse 96   Temp 36.7 °C (98.1 °F) (Temporal)   Resp 18   Ht 1.727 m (5' 8\")   Wt 76.6 kg (168 lb 14 oz)   SpO2 96%   BMI 25.68 kg/m²     "

## 2017-11-04 NOTE — ED PROVIDER NOTES
ED Provider Note    ED Provider Note          CHIEF COMPLAINT  Chief Complaint   Patient presents with   • Congestion     x 1 week    • Cough     x 1 week    • Fever     off and on for last week never took tempature just off and on sweating       HPI  Bryant Mix is a 32 y.o. male who presents to the Emergency DepartmentFor return of cough, congestion for about a week. He says he's had some subjective fevers as well. His cough is productive and persistent. They're just concerned because last time he had something like this he got pneumonia. Severe diffuse body aches as well. Denies any chest pain. Denies any nausea vomiting diarrhea. He took some over-the-counter cold medicine earlier today with no relief.    REVIEW OF SYSTEMS  Review of Systems   Constitutional: Positive for fever.   HENT: Positive for congestion and sore throat.    Respiratory: Positive for cough. Negative for shortness of breath, wheezing and stridor.    Cardiovascular: Negative for chest pain.   Gastrointestinal: Negative for abdominal pain.   Genitourinary: Negative for difficulty urinating.   Skin: Negative for rash.   Neurological: Negative for dizziness.       PAST MEDICAL HISTORY   has a past medical history of Hypoglycemia; Lactose intolerance; Psychiatric disorder; Seizure disorder (CMS-HCC); and Spina bifida (CMS-HCC).    SURGICAL HISTORY   has a past surgical history that includes other; tonsillectomy; and frenulum clipping.    SOCIAL HISTORY  Social History   Substance Use Topics   • Smoking status: Current Every Day Smoker     Packs/day: 0.50     Years: 6.00     Types: Cigarettes   • Smokeless tobacco: Former User     Types: Chew   • Alcohol use Yes      Comment: socially      History   Drug Use     Comment: marijuana       FAMILY HISTORY  History reviewed. No pertinent family history.    CURRENT MEDICATIONS  Reviewed.  See Encounter Summary.     ALLERGIES  Allergies   Allergen Reactions   • Vicodin  "[Hydrocodone-Acetaminophen] Rash   • Aspirin Hives       PHYSICAL EXAM  VITAL SIGNS: /53   Pulse 67   Temp 35.9 °C (96.6 °F)   Resp 14   Ht 1.727 m (5' 8\")   Wt 76.6 kg (168 lb 14 oz)   SpO2 96%   BMI 25.68 kg/m²   Physical Exam   Constitutional: He is oriented to person, place, and time.   HENT:   Head: Normocephalic and atraumatic.   Eyes: Pupils are equal, round, and reactive to light.   Neck: Normal range of motion.   Cardiovascular: Normal rate.    Pulmonary/Chest: Effort normal. He has rhonchi in the right middle field and the right lower field.   Abdominal: Soft.   Musculoskeletal: Normal range of motion.   Neurological: He is alert and oriented to person, place, and time.   Skin: Skin is warm and dry. He is not diaphoretic.   Psychiatric: He has a normal mood and affect.           DIAGNOSTIC STUDIES / PROCEDURES         RADIOLOGY  DX-CHEST-2 VIEWS   Final Result      No acute cardiopulmonary disease.        The radiologist's interpretation of all radiological studies have been reviewed by me.    COURSE & MEDICAL DECISION MAKING  Pertinent Labs & Imaging studies reviewed. (See chart for details)    1:16 AM - Patient seen and examined at bedside.  Decision Making:  This is a 32 y.o. year old male who presents with An Cerner of cough, congestion and other viral-like symptoms. He presented here at febrile and in no acute distress. However he thought he heard something in his right lung field however x-ray showed no pneumonia. It's possible this is just some residence for mucous plugging. Less likely just has a viral syndrome and upper respiratory infection. Discharged home in stable condition to follow-up with his regular doctor for recheck.    FINAL IMPRESSION  1. Viral syndrome                 "

## 2017-11-04 NOTE — ED NOTES
Discharge orders received, instructions and education given, follow-up discussed, work note provided, pt verbalized understanding.

## 2017-11-04 NOTE — ED NOTES
Pt brought back to rm YW 64 from triage. Pt able to transfer self to bed, pt in gown, family at bedside, call light in reach. Chart up for ERP.

## 2017-11-04 NOTE — ED NOTES
Patient walked to er [facility] area room 64 with family member x1, he has a steady gate at this time  Placed him into a gown, gave warm blanket, and call light.

## 2017-11-05 ENCOUNTER — PATIENT OUTREACH (OUTPATIENT)
Dept: HEALTH INFORMATION MANAGEMENT | Facility: OTHER | Age: 32
End: 2017-11-05

## 2018-01-10 ENCOUNTER — APPOINTMENT (OUTPATIENT)
Dept: RADIOLOGY | Facility: MEDICAL CENTER | Age: 33
End: 2018-01-10
Attending: EMERGENCY MEDICINE
Payer: OTHER MISCELLANEOUS

## 2018-01-10 ENCOUNTER — HOSPITAL ENCOUNTER (EMERGENCY)
Facility: MEDICAL CENTER | Age: 33
End: 2018-01-10
Attending: EMERGENCY MEDICINE
Payer: OTHER MISCELLANEOUS

## 2018-01-10 VITALS
SYSTOLIC BLOOD PRESSURE: 120 MMHG | WEIGHT: 160.94 LBS | RESPIRATION RATE: 16 BRPM | TEMPERATURE: 98.3 F | OXYGEN SATURATION: 94 % | HEART RATE: 88 BPM | DIASTOLIC BLOOD PRESSURE: 82 MMHG | HEIGHT: 68 IN | BODY MASS INDEX: 24.39 KG/M2
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
## 2018-01-10 PROCEDURE — 71045 X-RAY EXAM CHEST 1 VIEW: CPT

## 2018-01-10 PROCEDURE — 72131 CT LUMBAR SPINE W/O DYE: CPT

## 2018-01-10 PROCEDURE — 72125 CT NECK SPINE W/O DYE: CPT

## 2018-01-10 PROCEDURE — 99284 EMERGENCY DEPT VISIT MOD MDM: CPT

## 2018-01-10 RX ORDER — LORAZEPAM 2 MG/ML
INJECTION INTRAMUSCULAR
Status: COMPLETED
Start: 2018-01-10 | End: 2018-01-10

## 2018-01-10 RX ORDER — LEVETIRACETAM 100 MG/ML
10 SOLUTION ORAL 2 TIMES DAILY
Status: SHIPPED | COMMUNITY
End: 2023-03-08

## 2018-01-10 ASSESSMENT — ENCOUNTER SYMPTOMS
LOSS OF CONSCIOUSNESS: 0
BACK PAIN: 1
ABDOMINAL PAIN: 0
NECK PAIN: 1
DIZZINESS: 1
HEADACHES: 0
SHORTNESS OF BREATH: 0

## 2018-01-10 ASSESSMENT — PAIN SCALES - GENERAL: PAINLEVEL_OUTOF10: 9
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
## 2018-01-11 ENCOUNTER — PATIENT OUTREACH (OUTPATIENT)
Dept: HEALTH INFORMATION MANAGEMENT | Facility: OTHER | Age: 33
End: 2018-01-11
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
## 2018-01-11 NOTE — ED NOTES
Discharge instructions given. All questions answered. Pt to follow-up with PCP. Pt verbalized understanding. All belongings with pt. Pt self ambulatory to lobby.

## 2018-01-11 NOTE — PROGRESS NOTES
Placed discharge outreach phone call to patient s/p ER discharge 1/10/18.  Left voicemail providing my contact information and instructions to call with any questions or concerns.

## 2018-06-13 ENCOUNTER — NON-PROVIDER VISIT (OUTPATIENT)
Dept: URGENT CARE | Facility: PHYSICIAN GROUP | Age: 33
End: 2018-06-13

## 2018-06-13 DIAGNOSIS — Z02.1 PRE-EMPLOYMENT DRUG SCREENING: ICD-10-CM

## 2018-06-13 LAB
AMP AMPHETAMINE: NORMAL
COC COCAINE: NORMAL
INT CON NEG: NORMAL
INT CON POS: NORMAL
MET METHAMPHETAMINES: NORMAL
OPI OPIATES: NORMAL
PCP PHENCYCLIDINE: NORMAL
POC DRUG COMMENT 753798-OCCUPATIONAL HEALTH: NEGATIVE
THC: NORMAL

## 2018-06-13 PROCEDURE — 80305 DRUG TEST PRSMV DIR OPT OBS: CPT | Performed by: PHYSICIAN ASSISTANT

## 2018-07-06 ENCOUNTER — HOSPITAL ENCOUNTER (EMERGENCY)
Facility: MEDICAL CENTER | Age: 33
End: 2018-07-06
Attending: EMERGENCY MEDICINE
Payer: MEDICAID

## 2018-07-06 ENCOUNTER — APPOINTMENT (OUTPATIENT)
Dept: RADIOLOGY | Facility: MEDICAL CENTER | Age: 33
End: 2018-07-06
Attending: EMERGENCY MEDICINE
Payer: MEDICAID

## 2018-07-06 VITALS
BODY MASS INDEX: 22.15 KG/M2 | OXYGEN SATURATION: 98 % | WEIGHT: 146.16 LBS | DIASTOLIC BLOOD PRESSURE: 62 MMHG | TEMPERATURE: 98.6 F | SYSTOLIC BLOOD PRESSURE: 110 MMHG | HEIGHT: 68 IN | RESPIRATION RATE: 14 BRPM | HEART RATE: 51 BPM

## 2018-07-06 DIAGNOSIS — G89.29 CHRONIC LOW BACK PAIN WITHOUT SCIATICA, UNSPECIFIED BACK PAIN LATERALITY: ICD-10-CM

## 2018-07-06 DIAGNOSIS — M54.50 CHRONIC LOW BACK PAIN WITHOUT SCIATICA, UNSPECIFIED BACK PAIN LATERALITY: ICD-10-CM

## 2018-07-06 PROCEDURE — 700102 HCHG RX REV CODE 250 W/ 637 OVERRIDE(OP): Performed by: EMERGENCY MEDICINE

## 2018-07-06 PROCEDURE — A9270 NON-COVERED ITEM OR SERVICE: HCPCS | Performed by: EMERGENCY MEDICINE

## 2018-07-06 PROCEDURE — 72148 MRI LUMBAR SPINE W/O DYE: CPT

## 2018-07-06 PROCEDURE — 99284 EMERGENCY DEPT VISIT MOD MDM: CPT

## 2018-07-06 RX ORDER — CYCLOBENZAPRINE HCL 10 MG
10 TABLET ORAL 3 TIMES DAILY PRN
Qty: 15 TAB | Refills: 0 | Status: SHIPPED
Start: 2018-07-06 | End: 2023-03-08

## 2018-07-06 RX ORDER — OXYCODONE HYDROCHLORIDE AND ACETAMINOPHEN 5; 325 MG/1; MG/1
2 TABLET ORAL ONCE
Status: COMPLETED | OUTPATIENT
Start: 2018-07-06 | End: 2018-07-06

## 2018-07-06 RX ORDER — IBUPROFEN 800 MG/1
800 TABLET ORAL EVERY 8 HOURS PRN
Qty: 30 TAB | Refills: 0 | Status: SHIPPED
Start: 2018-07-06 | End: 2023-03-08

## 2018-07-06 RX ADMIN — OXYCODONE HYDROCHLORIDE AND ACETAMINOPHEN 2 TABLET: 5; 325 TABLET ORAL at 21:34
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
## 2018-07-07 NOTE — ED PROVIDER NOTES
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 male here for evaluation of low back pain,  That originally started when he was 18, but has progressively worsened over the last few weeks.  He now states that he has paresthesias down his L>R leg.  He is able to walk without difficulty, but does have subjective pain.  He has seen his doctor for the same.  He has no incontinence to bowel/bladder.  No urinary retention.  He has no trauma, no cp, and no sob.      PAST MEDICAL HISTORY   has a past medical history of Hypoglycemia; Lactose intolerance; Psychiatric disorder; Seizure disorder (HCC); and Spina bifida ([facility]).    SOCIAL HISTORY  Social History     Social History Main Topics   • Smoking status: Current Every Day Smoker     Packs/day: 0.50     Years: 6.00     Types: Cigarettes   • Smokeless tobacco: Former User     Types: Chew   • Alcohol use Yes      Comment: socially   • Drug use: No   • Sexual activity: Not on file       SURGICAL HISTORY   has a past surgical history that includes other; tonsillectomy; and frenulum clipping.    CURRENT MEDICATIONS  Home Medications     Reviewed by Mansi Groves R.N. (Registered Nurse) on 07/06/18 at 1813  Med List Status: Partial   Medication Last Dose Status   levetiracetam (KEPPRA) 100 MG/ML Solution 7/5/2018 Active                ALLERGIES  Allergies   Allergen Reactions   • Vicodin [Hydrocodone-Acetaminophen] Rash   • Aspirin Hives       REVIEW OF SYSTEMS  See HPI for further details. Review of systems as above, otherwise all other systems are negative.     PHYSICAL EXAM  VITAL SIGNS: /61   Pulse 79   Temp 37 °C (98.6 °F)   Resp 16   Ht 1.727 m (5' 8\")   Wt 66.3 kg (146 lb 2.6 oz)   SpO2 96%   BMI 22.22 kg/m²     Constitutional: Well developed, well nourished. mild acute distress.  HEENT: Normocephalic, atraumatic. MMM  Neck: " Supple, Full range of motion   Chest/Pulmonary:  No respiratory distress.  Equal expansion   Musculoskeletal: No deformity, no edema, neurovascular intact.   Back:  Tenderness to L3,L4 midline.  No obvious step off.   Neuro: Clear speech, appropriate, cooperative, cranial nerves II-XII grossly intact. Heel and toe walk intact.    Psych: Normal mood and affect      PROCEDURES     MEDICAL RECORD  I have reviewed patient's medical record and pertinent results are listed above.    COURSE & MEDICAL DECISION MAKING  I have reviewed any medical record information, laboratory studies and radiographic results as noted above.    9:35 PM  An mri was done secondary to his leg paresthesias.  I spoke to Dr. Hannah, who gave me a prelim read of no acute findings.  He will follow up, or return here for any further issues or concerns.     I you have had any blood pressure issues while here in the emergency department, please see your doctor for a further evaluation or work up.    Differential diagnoses include but not limited to: low back pain, strain, cauda equina, epidural abscess.     This patient presents with low back pain .  At this time, I have counseled the patient/family regarding their medications, pain control, and follow up.  They will continue their medications, if any, as prescribed.  They will return immediately for any worsening symptoms and/or any other medical concerns.  They will see their doctor, or contact the doctor provided, in 1-2 days for follow up.       FINAL IMPRESSION  1. Chronic low back pain without sciatica, unspecified back pain laterality        Electronically signed by: Norman Armenta, 7/6/2018 9:25 PM

## 2018-07-07 NOTE — ED TRIAGE NOTES
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

## 2018-07-07 NOTE — DISCHARGE INSTRUCTIONS
Chronic Pain Management  Managing chronic pain is not easy. The goal is to provide as much pain relief as possible. There are emotional as well as physical problems. Chronic pain may lead to symptoms of depression which magnify those of the pain.  Problems may include:  · Anxiety.  · Sleep disturbances.  · Confused thinking.  · Feeling cranky.  · Fatigue.  · Weight gain or loss.  Identify the source of the pain first, if possible. The pain may be masking another problem. Try to find a pain management specialist or clinic. Work with a team to create a treatment plan for you.  MEDICATIONS  · May include narcotics or opioids. Larger than normal doses may be needed to control your pain.  · Drugs for depression may help.  · Over-the-counter medicines may help for some conditions. These drugs may be used along with others for better pain relief.  · May be injected into sites such as the spine and joints. Injections may have to be repeated if they wear off.  THERAPY MAY INCLUDE:  · Working with a physical therapist to keep from getting stiff.  · Regular, gentle exercise.  · Cognitive or behavioral therapy.  · Using complementary or integrative medicine such as:  · Acupuncture.  · Massage, Reiki, or Rolfing.  · Aroma, color, light, or sound therapy.  · Group support.  FOR MORE INFORMATION  http://www.painfoundation.org.  American Chronic Pain Association http://www.thealpa.org.  Document Released: 01/25/2006 Document Revised: 03/11/2013 Document Reviewed: 03/05/2009  [employer]® Patient Information ©2014 [employer].    Back Pain, Adult  Introduction  Back pain is very common. The pain often gets better over time. The cause of back pain is usually not dangerous. Most people can learn to manage their back pain on their own.  Follow these instructions at home:  Watch your back pain for any changes. The following actions may help to lessen any pain you are feeling:  · Stay active. Start with short walks on flat ground if you  can. Try to walk farther each day.  · Exercise regularly as told by your doctor. Exercise helps your back heal faster. It also helps avoid future injury by keeping your muscles strong and flexible.  · Do not sit, drive, or  one place for more than 30 minutes.  · Do not stay in bed. Resting more than 1-2 days can slow down your recovery.  · Be careful when you bend or lift an object. Use good form when lifting:  ¨ Bend at your knees.  ¨ Keep the object close to your body.  ¨ Do not twist.  · Sleep on a firm mattress. Lie on your side, and bend your knees. If you lie on your back, put a pillow under your knees.  · Take medicines only as told by your doctor.  · Put ice on the injured area.  ¨ Put ice in a plastic bag.  ¨ Place a towel between your skin and the bag.  ¨ Leave the ice on for 20 minutes, 2-3 times a day for the first 2-3 days. After that, you can switch between ice and heat packs.  · Avoid feeling anxious or stressed. Find good ways to deal with stress, such as exercise.  · Maintain a healthy weight. Extra weight puts stress on your back.  Contact a doctor if:  · You have pain that does not go away with rest or medicine.  · You have worsening pain that goes down into your legs or buttocks.  · You have pain that does not get better in one week.  · You have pain at night.  · You lose weight.  · You have a fever or chills.  Get help right away if:  · You cannot control when you poop (bowel movement) or pee (urinate).  · Your arms or legs feel weak.  · Your arms or legs lose feeling (numbness).  · You feel sick to your stomach (nauseous) or throw up (vomit).  · You have belly (abdominal) pain.  · You feel like you may pass out (faint).  This information is not intended to replace advice given to you by your health care provider. Make sure you discuss any questions you have with your health care provider.  Document Released: 06/05/2009 Document Revised: 05/25/2017 Document Reviewed: 04/21/2015  © 2017  Elsevier

## 2018-07-07 NOTE — ED NOTES
Patient was educated on discharge instructions.  Patient was informed about diagnosis, symptom management, risks, and home care instructions.  Patient verbalized understanding and signed discharge instructions. Prescriptions handed to patient. Copy of discharge instructions in chart.  Patient ambulated out with steady gait. Patient has personal belongings.

## 2018-11-26 ENCOUNTER — NON-PROVIDER VISIT (OUTPATIENT)
Dept: URGENT CARE | Facility: PHYSICIAN GROUP | Age: 33
End: 2018-11-26

## 2018-11-26 DIAGNOSIS — Z02.1 PRE-EMPLOYMENT DRUG SCREENING: ICD-10-CM

## 2018-11-26 LAB
AMP AMPHETAMINE: NORMAL
COC COCAINE: NORMAL
INT CON NEG: NORMAL
INT CON POS: NORMAL
MET METHAMPHETAMINES: NORMAL
OPI OPIATES: NORMAL
PCP PHENCYCLIDINE: NORMAL
POC DRUG COMMENT 753798-OCCUPATIONAL HEALTH: NORMAL
THC: NORMAL

## 2018-11-26 PROCEDURE — 80305 DRUG TEST PRSMV DIR OPT OBS: CPT | Performed by: PHYSICIAN ASSISTANT

## 2019-03-12 ENCOUNTER — HOSPITAL ENCOUNTER (EMERGENCY)
Facility: MEDICAL CENTER | Age: 34
End: 2019-03-12
Attending: EMERGENCY MEDICINE
Payer: MEDICAID

## 2019-03-12 VITALS
HEIGHT: 68 IN | SYSTOLIC BLOOD PRESSURE: 117 MMHG | BODY MASS INDEX: 23.69 KG/M2 | TEMPERATURE: 98.2 F | HEART RATE: 64 BPM | OXYGEN SATURATION: 96 % | RESPIRATION RATE: 18 BRPM | DIASTOLIC BLOOD PRESSURE: 76 MMHG | WEIGHT: 156.31 LBS

## 2019-03-12 DIAGNOSIS — J06.9 UPPER RESPIRATORY TRACT INFECTION, UNSPECIFIED TYPE: ICD-10-CM

## 2019-03-12 PROCEDURE — 99283 EMERGENCY DEPT VISIT LOW MDM: CPT

## 2019-03-12 RX ORDER — ALBUTEROL SULFATE 90 UG/1
2 AEROSOL, METERED RESPIRATORY (INHALATION) EVERY 6 HOURS PRN
Qty: 8.5 G | Refills: 0 | Status: SHIPPED | OUTPATIENT
Start: 2019-03-12 | End: 2023-03-08

## 2019-03-12 RX ORDER — BENZONATATE 100 MG/1
100 CAPSULE ORAL 3 TIMES DAILY PRN
Qty: 30 CAP | Refills: 0 | Status: SHIPPED | OUTPATIENT
Start: 2019-03-12 | End: 2023-03-08

## 2019-03-12 NOTE — ED NOTES
D/C home with written and verbal instructions re: Rx, activity, f/u.  Verbalizes understanding.  Ambulated out with steady gait with family.

## 2019-03-12 NOTE — ED PROVIDER NOTES
ED Provider Note    Scribed for Brijesh Velazco M.D. by Jaki Daigle. 3/12/2019  1:23 PM    Primary care provider: ELI Estevez  Means of arrival: Private vehicle  History obtained from: Patient  History limited by: None    CHIEF COMPLAINT  Chief Complaint   Patient presents with   • Cold Symptoms     HPI  Bryant Mix is a 33 y.o. male smoker who presents to the Emergency Department complaining of cold symptoms including cough, chest congestion, and nasal congestion onset 3-4 days ago. Additionally, he reports his head feeling like it is going in slow motion. He also reports chronic inguinal pain, more prominent on the left side, intermittently over the past few years. He has been seen for his pelvic pain without any diagnosis. Admits to having 1 cigarette today. Denies emesis, fever, and abdominal pain. Denies allergies to medications.     REVIEW OF SYSTEMS  Pertinent negatives include no nausea, fever, vomiting, abdominal pain.      PAST MEDICAL HISTORY   has a past medical history of Hypoglycemia; Lactose intolerance; Psychiatric disorder; Seizure disorder (HCC); and Spina bifida (HCC).    SURGICAL HISTORY   has a past surgical history that includes other; tonsillectomy; and frenulum clipping.    SOCIAL HISTORY  Social History   Substance Use Topics   • Smoking status: Current Every Day Smoker     Packs/day: 0.50     Years: 6.00     Types: Cigarettes   • Smokeless tobacco: Former User     Types: Chew   • Alcohol use Yes      Comment: socially      History   Drug Use No       FAMILY HISTORY  No family history on file.    CURRENT MEDICATIONS  Home Medications     Reviewed by Estrella Ponce R.N. (Registered Nurse) on 03/12/19 at 1245  Med List Status: Partial   Medication Last Dose Status   cyclobenzaprine (FLEXERIL) 10 MG Tab  Active   ibuprofen (MOTRIN) 800 MG Tab  Active   levetiracetam (KEPPRA) 100 MG/ML Solution  Active                ALLERGIES  Allergies   Allergen Reactions  "  • Vicodin [Hydrocodone-Acetaminophen] Rash   • Aspirin Hives       PHYSICAL EXAM  VITAL SIGNS: /74   Pulse 66   Temp 36.8 °C (98.2 °F) (Temporal)   Resp 16   Ht 1.727 m (5' 8\")   Wt 70.9 kg (156 lb 4.9 oz)   SpO2 97%   BMI 23.77 kg/m²     Constitutional: Well developed, Well nourished, No acute distress, Non-toxic appearance.   HENT: Normocephalic, Atraumatic, Bilateral external ears normal, Oropharynx is clear mucous membranes are moist. No oral exudates or nasal discharge.   Eyes: Pupils are equal round and reactive, EOMI, Conjunctiva normal, No discharge.   Neck: Normal range of motion, No tenderness, Supple, No stridor. No meningismus.  Lymphatic: No lymphadenopathy noted.   Cardiovascular: Regular rate and rhythm without murmur rub or gallop.  Thorax & Lungs: Clear breath sounds bilaterally without wheezes, rhonchi or rales. There is no chest wall tenderness.   Abdomen: Soft non-tender non-distended. There is no rebound or guarding. No organomegaly is appreciated. Bowel sounds are normal.  Skin: Normal without rash.   Extremities: Intact distal pulses, No edema, No tenderness, No cyanosis, No clubbing. Capillary refill is less than 2 seconds.  Musculoskeletal: Good range of motion in all major joints. No tenderness to palpation or major deformities noted.   Neurologic: Alert & oriented x 3, Normal motor function, Normal sensory function, No focal deficits noted. Reflexes are normal.  Psychiatric: Affect normal, Judgment normal, Mood normal. There is no suicidal ideation or patient reported hallucinations.       COURSE & MEDICAL DECISION MAKING  Nursing notes, VS [id number] reviewed in chart.    1:23 PM - Patient seen and examined at bedside. Reassured patient that his oxygen sat is good. I hear no evidence of pneumonia or wheezing on exam.  There is no evidence of a focal bacterial infection such as pneumonia.  He may have influenza or viral upper story infection.  He will be discharged home with " Tessalon and an albuterol inhaler. Further encouraged using a cool mist humidifier. In regards to his pelvic pain, I reassured him that I feel no inguinal hernia. Will give a referral to Urologist and return to work note as requested. ED return precautions discussed and follow-up encouraged. He verbalized his understanding and will comply to the discharge instructions.     The patient will return for new or worsening symptoms and is stable at the time of discharge.    DISPOSITION:  Patient will be discharged home in stable condition.    FINAL IMPRESSION  1. Upper respiratory tract infection, unspecified type          I, Jaki Daigle (Scribe), am scribing for, and in the presence of, Brijesh Velazco M.D..    Electronically signed by: Jaki Daigle (Cheryl), 3/12/2019    IBrijesh M.D. personally performed the services described in this documentation, as scribed by Jaki Daigle in my presence, and it is both accurate and complete. E    The note accurately reflects work and decisions made by me.  Brijesh Velazco  3/13/2019  7:43 AM       Osteoarthritis of right knee, unspecified osteoarthritis type  05/11/2017    Active  Zach Schaefer

## 2019-03-12 NOTE — ED TRIAGE NOTES
"Chief Complaint   Patient presents with   • Cold Symptoms     Pt reports cold symptoms x 2 days. Pt also reports pelvic pain and feeling \"like i'm going in slow motion.\"  Blood pressure 126/74, pulse 66, temperature 36.8 °C (98.2 °F), temperature source Temporal, resp. rate 16, height 1.727 m (5' 8\"), weight 70.9 kg (156 lb 4.9 oz), SpO2 97 %.    Pt informed of wait times. Educated on triage process.  Asked to return to triage RN for any new or worsening of symptoms. Thanked for patience.        "

## 2019-03-12 NOTE — ED NOTES
Pt complains of dizziness, cough, congestion x3-4days; pt also complains of pelvic pain that comes and goes.

## 2019-07-12 ENCOUNTER — HOSPITAL ENCOUNTER (EMERGENCY)
Dept: HOSPITAL 8 - ED | Age: 34
Discharge: HOME | End: 2019-07-12
Payer: MEDICAID

## 2019-07-12 VITALS
DIASTOLIC BLOOD PRESSURE: 74 MMHG | HEIGHT: 68 IN | BODY MASS INDEX: 23.52 KG/M2 | SYSTOLIC BLOOD PRESSURE: 125 MMHG | WEIGHT: 155.21 LBS

## 2019-07-12 DIAGNOSIS — A59.9: Primary | ICD-10-CM

## 2019-07-12 DIAGNOSIS — G40.909: ICD-10-CM

## 2019-07-12 DIAGNOSIS — F17.200: ICD-10-CM

## 2019-07-12 DIAGNOSIS — J45.909: ICD-10-CM

## 2019-07-12 PROCEDURE — 99283 EMERGENCY DEPT VISIT LOW MDM: CPT

## 2019-07-12 PROCEDURE — 87491 CHLMYD TRACH DNA AMP PROBE: CPT

## 2019-07-12 PROCEDURE — 87591 N.GONORRHOEAE DNA AMP PROB: CPT

## 2019-07-17 ENCOUNTER — HOSPITAL ENCOUNTER (EMERGENCY)
Facility: MEDICAL CENTER | Age: 34
End: 2019-07-17
Attending: EMERGENCY MEDICINE
Payer: MEDICAID

## 2019-07-17 ENCOUNTER — APPOINTMENT (OUTPATIENT)
Dept: RADIOLOGY | Facility: MEDICAL CENTER | Age: 34
End: 2019-07-17
Attending: EMERGENCY MEDICINE
Payer: MEDICAID

## 2019-07-17 VITALS
RESPIRATION RATE: 16 BRPM | TEMPERATURE: 98.3 F | SYSTOLIC BLOOD PRESSURE: 108 MMHG | HEIGHT: 68 IN | DIASTOLIC BLOOD PRESSURE: 74 MMHG | WEIGHT: 167 LBS | BODY MASS INDEX: 25.31 KG/M2 | HEART RATE: 88 BPM | OXYGEN SATURATION: 98 %

## 2019-07-17 DIAGNOSIS — R07.9 CHEST PAIN, UNSPECIFIED TYPE: ICD-10-CM

## 2019-07-17 LAB
ALBUMIN SERPL BCP-MCNC: 4.5 G/DL (ref 3.2–4.9)
ALBUMIN/GLOB SERPL: 1.5 G/DL
ALP SERPL-CCNC: 63 U/L (ref 30–99)
ALT SERPL-CCNC: 13 U/L (ref 2–50)
ANION GAP SERPL CALC-SCNC: 10 MMOL/L (ref 0–11.9)
AST SERPL-CCNC: 19 U/L (ref 12–45)
BASOPHILS # BLD AUTO: 0.6 % (ref 0–1.8)
BASOPHILS # BLD: 0.04 K/UL (ref 0–0.12)
BILIRUB SERPL-MCNC: 0.5 MG/DL (ref 0.1–1.5)
BUN SERPL-MCNC: 14 MG/DL (ref 8–22)
CALCIUM SERPL-MCNC: 9.9 MG/DL (ref 8.5–10.5)
CHLORIDE SERPL-SCNC: 106 MMOL/L (ref 96–112)
CO2 SERPL-SCNC: 23 MMOL/L (ref 20–33)
CREAT SERPL-MCNC: 0.93 MG/DL (ref 0.5–1.4)
EKG IMPRESSION: NORMAL
EOSINOPHIL # BLD AUTO: 0.04 K/UL (ref 0–0.51)
EOSINOPHIL NFR BLD: 0.6 % (ref 0–6.9)
ERYTHROCYTE [DISTWIDTH] IN BLOOD BY AUTOMATED COUNT: 40.3 FL (ref 35.9–50)
GLOBULIN SER CALC-MCNC: 3 G/DL (ref 1.9–3.5)
GLUCOSE SERPL-MCNC: 102 MG/DL (ref 65–99)
HCT VFR BLD AUTO: 48.7 % (ref 42–52)
HGB BLD-MCNC: 17.4 G/DL (ref 14–18)
IMM GRANULOCYTES # BLD AUTO: 0.02 K/UL (ref 0–0.11)
IMM GRANULOCYTES NFR BLD AUTO: 0.3 % (ref 0–0.9)
LYMPHOCYTES # BLD AUTO: 2.69 K/UL (ref 1–4.8)
LYMPHOCYTES NFR BLD: 39.6 % (ref 22–41)
MCH RBC QN AUTO: 31 PG (ref 27–33)
MCHC RBC AUTO-ENTMCNC: 35.7 G/DL (ref 33.7–35.3)
MCV RBC AUTO: 86.7 FL (ref 81.4–97.8)
MONOCYTES # BLD AUTO: 0.61 K/UL (ref 0–0.85)
MONOCYTES NFR BLD AUTO: 9 % (ref 0–13.4)
NEUTROPHILS # BLD AUTO: 3.4 K/UL (ref 1.82–7.42)
NEUTROPHILS NFR BLD: 49.9 % (ref 44–72)
NRBC # BLD AUTO: 0 K/UL
NRBC BLD-RTO: 0 /100 WBC
PLATELET # BLD AUTO: 224 K/UL (ref 164–446)
PMV BLD AUTO: 9.7 FL (ref 9–12.9)
POTASSIUM SERPL-SCNC: 3.7 MMOL/L (ref 3.6–5.5)
PROT SERPL-MCNC: 7.5 G/DL (ref 6–8.2)
RBC # BLD AUTO: 5.62 M/UL (ref 4.7–6.1)
SODIUM SERPL-SCNC: 139 MMOL/L (ref 135–145)
TROPONIN T SERPL-MCNC: <6 NG/L (ref 6–19)
WBC # BLD AUTO: 6.8 K/UL (ref 4.8–10.8)

## 2019-07-17 PROCEDURE — 84484 ASSAY OF TROPONIN QUANT: CPT

## 2019-07-17 PROCEDURE — 80053 COMPREHEN METABOLIC PANEL: CPT

## 2019-07-17 PROCEDURE — 85025 COMPLETE CBC W/AUTO DIFF WBC: CPT

## 2019-07-17 PROCEDURE — 99284 EMERGENCY DEPT VISIT MOD MDM: CPT

## 2019-07-17 PROCEDURE — 93005 ELECTROCARDIOGRAM TRACING: CPT

## 2019-07-17 PROCEDURE — 700102 HCHG RX REV CODE 250 W/ 637 OVERRIDE(OP): Performed by: EMERGENCY MEDICINE

## 2019-07-17 PROCEDURE — 71045 X-RAY EXAM CHEST 1 VIEW: CPT

## 2019-07-17 PROCEDURE — 93005 ELECTROCARDIOGRAM TRACING: CPT | Performed by: EMERGENCY MEDICINE

## 2019-07-17 PROCEDURE — A9270 NON-COVERED ITEM OR SERVICE: HCPCS | Performed by: EMERGENCY MEDICINE

## 2019-07-17 RX ORDER — IBUPROFEN 600 MG/1
600 TABLET ORAL ONCE
Status: COMPLETED | OUTPATIENT
Start: 2019-07-17 | End: 2019-07-17

## 2019-07-17 RX ADMIN — IBUPROFEN 600 MG: 600 TABLET ORAL at 14:41

## 2019-07-17 NOTE — ED PROVIDER NOTES
ED Provider Note    CHIEF COMPLAINT  Chief Complaint   Patient presents with   • Chest Pain     Pt reports symptoms for 3 wks, pt reports pain to move around body, left/right. pt explains laying down feels better. pt reports pain upon inspiration.    • Rib Pain   • Epigastric Pain   • Syncope   • Shortness of Breath       HPI  Bryant Mix is a 33 y.o. male who presents with left-sided chest pain.  The patient states he has had this pain over the last 3 to 4 weeks.  States the pain is in the left side of his chest describes as sharp but also goes throughout the left side of his body.  He states he also has some diffuse paresthesias.  He does have associated shortness of breath but no nausea nor diaphoresis.  He does not have any pain or swelling to his lower extremities.  He did have a couple near syncopal episodes when going from sitting to standing.  He does not have any palpitations.  The patient does abuse tobacco products but otherwise does not have any cardiac risk factors.    REVIEW OF SYSTEMS  See HPI for further details. All other systems are negative.     PAST MEDICAL HISTORY  Past Medical History:   Diagnosis Date   • Hypoglycemia    • Lactose intolerance    • Psychiatric disorder     bipolar   • Seizure disorder (HCC)    • Spina bifida (HCC)        FAMILY HISTORY  [unfilled]    SOCIAL HISTORY  Social History     Social History   • Marital status:      Spouse name: N/A   • Number of children: N/A   • Years of education: N/A     Social History Main Topics   • Smoking status: Current Every Day Smoker     Packs/day: 0.50     Years: 6.00     Types: Cigarettes   • Smokeless tobacco: Former User     Types: Chew   • Alcohol use Yes      Comment: socially   • Drug use: No   • Sexual activity: Not on file     Other Topics Concern   • Not on file     Social History Narrative   • No narrative on file       SURGICAL HISTORY  Past Surgical History:   Procedure Laterality Date   • FRENULUM CLIPPING    "  • OTHER      tubes in ears as a child   • TONSILLECTOMY         CURRENT MEDICATIONS  Home Medications    **Home medications have not yet been reviewed for this encounter**         ALLERGIES  Allergies   Allergen Reactions   • Vicodin [Hydrocodone-Acetaminophen] Rash   • Aspirin Hives       PHYSICAL EXAM  VITAL SIGNS: /60   Pulse 82   Temp 36.8 °C (98.3 °F) (Temporal)   Resp 18   Ht 1.727 m (5' 8\")   Wt 75.8 kg (167 lb)   SpO2 100%   BMI 25.39 kg/m²       Constitutional: Well developed, Well nourished, No acute distress, Non-toxic appearance.   HENT: Normocephalic, Atraumatic, Bilateral external ears normal, Oropharynx moist, No oral exudates, Nose normal.   Eyes: PERRLA, EOMI, Conjunctiva normal, No discharge.   Neck: Normal range of motion, No tenderness, Supple, No stridor.   Lymphatic: No lymphadenopathy noted.   Cardiovascular: Normal heart rate, Normal rhythm, No murmurs, No rubs, No gallops.   Thorax & Lungs: Normal breath sounds, No respiratory distress, No wheezing, left chest tenderness.   Abdomen: Bowel sounds normal, Soft, No tenderness, No masses, No pulsatile masses.   Skin: Warm, Dry, No erythema, No rash.   Back: No tenderness, No CVA tenderness.   Extremities: Intact distal pulses, No edema, No tenderness, No cyanosis, No clubbing.   Musculoskeletal: Good range of motion in all major joints. No tenderness to palpation or major deformities noted.   Neurologic: Alert & oriented x 3, Normal motor function, Normal sensory function, No focal deficits noted.   Psychiatric: Affect normal, Judgment normal, Mood normal.     Results for orders placed or performed during the hospital encounter of 07/17/19   CBC with Differential   Result Value Ref Range    WBC 6.8 4.8 - 10.8 K/uL    RBC 5.62 4.70 - 6.10 M/uL    Hemoglobin 17.4 14.0 - 18.0 g/dL    Hematocrit 48.7 42.0 - 52.0 %    MCV 86.7 81.4 - 97.8 fL    MCH 31.0 27.0 - 33.0 pg    MCHC 35.7 (H) 33.7 - 35.3 g/dL    RDW 40.3 35.9 - 50.0 fL    " Platelet Count 224 164 - 446 K/uL    MPV 9.7 9.0 - 12.9 fL    Neutrophils-Polys 49.90 44.00 - 72.00 %    Lymphocytes 39.60 22.00 - 41.00 %    Monocytes 9.00 0.00 - 13.40 %    Eosinophils 0.60 0.00 - 6.90 %    Basophils 0.60 0.00 - 1.80 %    Immature Granulocytes 0.30 0.00 - 0.90 %    Nucleated RBC 0.00 /100 WBC    Neutrophils (Absolute) 3.40 1.82 - 7.42 K/uL    Lymphs (Absolute) 2.69 1.00 - 4.80 K/uL    Monos (Absolute) 0.61 0.00 - 0.85 K/uL    Eos (Absolute) 0.04 0.00 - 0.51 K/uL    Baso (Absolute) 0.04 0.00 - 0.12 K/uL    Immature Granulocytes (abs) 0.02 0.00 - 0.11 K/uL    NRBC (Absolute) 0.00 K/uL   Complete Metabolic Panel (CMP)   Result Value Ref Range    Sodium 139 135 - 145 mmol/L    Potassium 3.7 3.6 - 5.5 mmol/L    Chloride 106 96 - 112 mmol/L    Co2 23 20 - 33 mmol/L    Anion Gap 10.0 0.0 - 11.9    Glucose 102 (H) 65 - 99 mg/dL    Bun 14 8 - 22 mg/dL    Creatinine 0.93 0.50 - 1.40 mg/dL    Calcium 9.9 8.5 - 10.5 mg/dL    AST(SGOT) 19 12 - 45 U/L    ALT(SGPT) 13 2 - 50 U/L    Alkaline Phosphatase 63 30 - 99 U/L    Total Bilirubin 0.5 0.1 - 1.5 mg/dL    Albumin 4.5 3.2 - 4.9 g/dL    Total Protein 7.5 6.0 - 8.2 g/dL    Globulin 3.0 1.9 - 3.5 g/dL    A-G Ratio 1.5 g/dL   Troponin   Result Value Ref Range    Troponin T <6 6 - 19 ng/L   ESTIMATED GFR   Result Value Ref Range    GFR If African American >60 >60 mL/min/1.73 m 2    GFR If Non African American >60 >60 mL/min/1.73 m 2   EKG (NOW)   Result Value Ref Range    Report       [facility] Emergency Dept.    Test Date:  2019  Pt Name:    JENNIFFER ARCE          Department: ER  MRN:        [id number]                      Room:  Gender:     Male                         Technician: [id number]  :        1985                   Requested By:ER TRIAGE PROTOCOL  Order #:    [id number]                    Reading MD: MARIAA BEAN MD    Measurements  Intervals                                Axis  Rate:       76                            P:          34  CO:         132                          QRS:        44  QRSD:       86                           T:          69  QT:         376  QTc:        423    Interpretive Statements  Twelve-lead EKG shows a normal sinus rhythm with a ventricular to 76, QRS is  poor  R wave progression, no ST segment elevation or depression, T waves inverted  in  aVL and lead V1.  Overall no ischemic changes  Electronically Signed On 7- 14:40:08 PDT by BURAK ZENDEJAS MD         RADIOLOGY/PROCEDURES  DX-CHEST-PORTABLE (1 VIEW)   Final Result         No acute cardiac or pulmonary abnormality is identified.          COURSE & MEDICAL DECISION MAKING  Pertinent Labs & Imaging studies reviewed. (See chart for details)  This a 33-year-old gentleman who presents the emerge department with chest discomfort.  The patient's heart score is 0 and therefore is very low risk from a cardiac standpoint.  I did perform a chest x-ray to see if there is any evidence of pulmonary source and this was negative as well.  This could be from anxiety.  His abdomen is benign therefore referred pain would be unlikely.  I do not have a clear source for his presenting symptoms but the patient does not appear toxic.  He will continue take anti-inflammatories for pain control and he will follow-up with his primary care doctor next week for continued outpatient management.  The patient states at the time of discharge that stress seems to make things worse this could also be from esophagitis.  We will place the patient off work for 1 day so he can rest and take anti-inflammatories.    FINAL IMPRESSION  1.  Chest pain    Disposition  The patient will be discharged in stable condition    Electronically signed by: Burak Zendejas, 7/17/2019 2:38 PM

## 2019-07-17 NOTE — ED TRIAGE NOTES
Chief Complaint   Patient presents with   • Chest Pain     Pt reports symptoms for 3 wks, pt reports pain to move around body, left/right. pt explains laying down feels better. pt reports pain upon inspiration.    • Rib Pain   • Epigastric Pain   • Syncope   • Shortness of Breath     Explained to pt triage process, made pt aware to tell this RN/staff of any changes/concerns, pt verbalized understanding of process and instructions given. Pt to ER lobby.

## 2019-08-08 ENCOUNTER — HOSPITAL ENCOUNTER (EMERGENCY)
Dept: HOSPITAL 8 - ED | Age: 34
Discharge: HOME | End: 2019-08-08
Payer: MEDICAID

## 2019-08-08 VITALS — DIASTOLIC BLOOD PRESSURE: 67 MMHG | SYSTOLIC BLOOD PRESSURE: 100 MMHG

## 2019-08-08 VITALS — HEIGHT: 68 IN | WEIGHT: 165.35 LBS | BODY MASS INDEX: 25.06 KG/M2

## 2019-08-08 DIAGNOSIS — R07.9: ICD-10-CM

## 2019-08-08 DIAGNOSIS — R11.10: ICD-10-CM

## 2019-08-08 DIAGNOSIS — J45.909: ICD-10-CM

## 2019-08-08 DIAGNOSIS — G40.909: ICD-10-CM

## 2019-08-08 DIAGNOSIS — R09.1: Primary | ICD-10-CM

## 2019-08-08 PROCEDURE — 84484 ASSAY OF TROPONIN QUANT: CPT

## 2019-08-08 PROCEDURE — 85379 FIBRIN DEGRADATION QUANT: CPT

## 2019-08-08 PROCEDURE — 85025 COMPLETE CBC W/AUTO DIFF WBC: CPT

## 2019-08-08 PROCEDURE — 93005 ELECTROCARDIOGRAM TRACING: CPT

## 2019-08-08 PROCEDURE — 71045 X-RAY EXAM CHEST 1 VIEW: CPT

## 2019-08-08 PROCEDURE — 99284 EMERGENCY DEPT VISIT MOD MDM: CPT

## 2019-08-08 PROCEDURE — 80048 BASIC METABOLIC PNL TOTAL CA: CPT

## 2019-08-08 PROCEDURE — 82040 ASSAY OF SERUM ALBUMIN: CPT

## 2019-08-08 PROCEDURE — 96372 THER/PROPH/DIAG INJ SC/IM: CPT

## 2019-08-08 PROCEDURE — 36415 COLL VENOUS BLD VENIPUNCTURE: CPT

## 2019-09-27 ENCOUNTER — NON-PROVIDER VISIT (OUTPATIENT)
Dept: URGENT CARE | Facility: PHYSICIAN GROUP | Age: 34
End: 2019-09-27

## 2019-09-27 DIAGNOSIS — Z02.1 PRE-EMPLOYMENT DRUG SCREENING: ICD-10-CM

## 2019-09-27 PROCEDURE — 80305 DRUG TEST PRSMV DIR OPT OBS: CPT | Performed by: PHYSICIAN ASSISTANT

## 2019-11-22 ENCOUNTER — HOSPITAL ENCOUNTER (EMERGENCY)
Facility: MEDICAL CENTER | Age: 34
End: 2019-11-22
Attending: EMERGENCY MEDICINE
Payer: MEDICAID

## 2019-11-22 ENCOUNTER — APPOINTMENT (OUTPATIENT)
Dept: RADIOLOGY | Facility: MEDICAL CENTER | Age: 34
End: 2019-11-22
Attending: EMERGENCY MEDICINE
Payer: MEDICAID

## 2019-11-22 VITALS
DIASTOLIC BLOOD PRESSURE: 65 MMHG | OXYGEN SATURATION: 95 % | TEMPERATURE: 99.3 F | HEART RATE: 86 BPM | RESPIRATION RATE: 16 BRPM | WEIGHT: 157.41 LBS | BODY MASS INDEX: 23.86 KG/M2 | HEIGHT: 68 IN | SYSTOLIC BLOOD PRESSURE: 115 MMHG

## 2019-11-22 DIAGNOSIS — J10.1 INFLUENZA B: ICD-10-CM

## 2019-11-22 LAB
APPEARANCE UR: CLEAR
BILIRUB UR QL STRIP.AUTO: NEGATIVE
COLOR UR: YELLOW
FLUAV RNA SPEC QL NAA+PROBE: NEGATIVE
FLUBV RNA SPEC QL NAA+PROBE: POSITIVE
GLUCOSE UR STRIP.AUTO-MCNC: NEGATIVE MG/DL
KETONES UR STRIP.AUTO-MCNC: ABNORMAL MG/DL
LEUKOCYTE ESTERASE UR QL STRIP.AUTO: NEGATIVE
MICRO URNS: ABNORMAL
NITRITE UR QL STRIP.AUTO: NEGATIVE
PH UR STRIP.AUTO: 6 [PH] (ref 5–8)
PROT UR QL STRIP: NEGATIVE MG/DL
RBC UR QL AUTO: NEGATIVE
S PYO DNA SPEC NAA+PROBE: NOT DETECTED
SP GR UR STRIP.AUTO: 1.03
UROBILINOGEN UR STRIP.AUTO-MCNC: 1 MG/DL

## 2019-11-22 PROCEDURE — 71046 X-RAY EXAM CHEST 2 VIEWS: CPT

## 2019-11-22 PROCEDURE — 87651 STREP A DNA AMP PROBE: CPT

## 2019-11-22 PROCEDURE — 81003 URINALYSIS AUTO W/O SCOPE: CPT

## 2019-11-22 PROCEDURE — 700102 HCHG RX REV CODE 250 W/ 637 OVERRIDE(OP): Performed by: EMERGENCY MEDICINE

## 2019-11-22 PROCEDURE — 87502 INFLUENZA DNA AMP PROBE: CPT

## 2019-11-22 PROCEDURE — 99284 EMERGENCY DEPT VISIT MOD MDM: CPT

## 2019-11-22 PROCEDURE — A9270 NON-COVERED ITEM OR SERVICE: HCPCS | Performed by: EMERGENCY MEDICINE

## 2019-11-22 RX ORDER — ONDANSETRON 4 MG/1
4 TABLET, ORALLY DISINTEGRATING ORAL EVERY 8 HOURS PRN
Qty: 10 TAB | Refills: 0 | Status: SHIPPED | OUTPATIENT
Start: 2019-11-22 | End: 2023-03-08

## 2019-11-22 RX ORDER — BENZONATATE 100 MG/1
100 CAPSULE ORAL 3 TIMES DAILY PRN
Qty: 15 CAP | Refills: 0 | Status: SHIPPED | OUTPATIENT
Start: 2019-11-22 | End: 2023-03-08

## 2019-11-22 RX ORDER — IBUPROFEN 600 MG/1
400 TABLET ORAL EVERY 6 HOURS PRN
Qty: 20 TAB | Refills: 0 | Status: SHIPPED | OUTPATIENT
Start: 2019-11-22 | End: 2019-12-02

## 2019-11-22 RX ORDER — IBUPROFEN 200 MG
400 TABLET ORAL ONCE
Status: COMPLETED | OUTPATIENT
Start: 2019-11-22 | End: 2019-11-22

## 2019-11-22 RX ADMIN — IBUPROFEN 400 MG: 200 TABLET, FILM COATED ORAL at 18:10

## 2019-11-22 NOTE — ED TRIAGE NOTES
Chief Complaint   Patient presents with   • Flu Like Symptoms   Pt to triage in NAD.  Pt reports flu like symptoms x 2-3 days.  Pt educated on triage process and instructed to notify triage RN of any change in status.

## 2019-11-23 NOTE — ED NOTES
Discharge instructions given to pt including follow up with pcp or returning if no improvement of symptoms or to return if worse. Prescription x 3 provided to pt. Questions answered by RN. Denies any new complaints. Discharged w/stable vitals and able to ambulate w/steady gait.

## 2019-11-23 NOTE — ED PROVIDER NOTES
"ED Provider Note    CHIEF COMPLAINT  Chief Complaint   Patient presents with   • Flu Like Symptoms       HPI  Bryant Mix is a 34 y.o. male who presents for evaluation of \"flulike symptoms\" for approximately 3 days.  Patient describes fevers, body aches and chills at home as well as generalized malaise.  He has a sore throat and a runny nose.  He does not have any abdominal pain, chest pain, nausea, vomiting, or diarrhea.  He has not had any hemoptysis and has no consistent chest pain although he does note pain with coughing in the upper central portion of his chest.  The patient's girlfriend, who accompanies him, also states that he has been having petit mall seizures.  He is a known epileptic and that is taking his medication as prescribed.      REVIEW OF SYSTEMS  Constitutional: Subjective fevers, chills, malaise  Skin: No rashes  HEENT: Sore throat, runny nose.  No ear pain.  Neck: No neck pain, stiffness, or masses.  Chest: No pain   Pulm: Pain with coughing, no stridor, no wheezing.  No resting or exertional shortness of breath  Gastrointestinal: No nausea, vomiting, diarrhea, or constipation.  No abdominal pain.  : Dysuria, no hematuria, no testicular or penile pain.  No discharge.  Musculoskeletal: No recent trauma, swelling, or focal weakness.Generalized aches.  Immuno: No hx of recurrent infections      PAST MEDICAL HISTORY   has a past medical history of Hypoglycemia, Lactose intolerance, Psychiatric disorder, Seizure disorder (HCC), and Spina bifida ([facility]).    SOCIAL HISTORY  Social History     Tobacco Use   • Smoking status: Current Every Day Smoker     Packs/day: 0.50     Years: 6.00     Pack years: 3.00     Types: Cigarettes   • Smokeless tobacco: Former User     Types: Chew   Substance and Sexual Activity   • Alcohol use: Yes     Comment: socially   • Drug use: No   • Sexual activity: Not on file       SURGICAL HISTORY   has a past surgical history that includes other; tonsillectomy; " "and frenulum clipping.    CURRENT MEDICATIONS  Home Medications     Reviewed by Brina Bonds R.N. (Registered Nurse) on 11/22/19 at 1551  Med List Status: <None>   Medication Last Dose Status   albuterol 108 (90 Base) MCG/ACT Aero Soln inhalation aerosol  Active   albuterol 108 (90 Base) MCG/ACT Aero Soln inhalation aerosol  Active   benzonatate (TESSALON) 100 MG Cap  Active   benzonatate (TESSALON) 100 MG Cap  Active   cyclobenzaprine (FLEXERIL) 10 MG Tab  Active   ibuprofen (MOTRIN) 800 MG Tab  Active   levetiracetam (KEPPRA) 100 MG/ML Solution  Active                ALLERGIES  Allergies   Allergen Reactions   • Vicodin [Hydrocodone-Acetaminophen] Rash   • Aspirin Hives       PHYSICAL EXAM  VITAL SIGNS: /65   Pulse 86   Temp 37.4 °C (99.3 °F) (Temporal)   Resp 16   Ht 1.727 m (5' 8\")   Wt 71.4 kg (157 lb 6.5 oz)   SpO2 95%   BMI 23.93 kg/m²    Gen: Alert, appears tired, otherwise in no apparent distress.  Calm, conversant  HEENT: No signs of trauma, Bilateral external ears normal, Nose normal. Conjunctiva normal, Non-icteric.  Moderate posterior pharynx erythema.  No exudate or asymmetry  Neck:  No tenderness, Supple, No masses  Lymphatic: Mild anterior cervical adenopathy noted along the peritracheal chains.  These are mobile, subcentimeter, nontender.   Cardiovascular: Regular rate and rhythm, no murmurs.   Thorax & Lungs: Normal breath sounds, No respiratory distress, No wheezing bilateral chest rise  Abdomen: Bowel sounds normal, Soft, No tenderness  Skin: Warm, Dry, No erythema, No rash to exposed areas.   Extremities: Intact distal pulses, No edema    LABS  Results for orders placed or performed during the hospital encounter of 11/22/19   Influenza A/B By PCR (Adult - Flu Only)   Result Value Ref Range    Influenza virus A RNA Negative Negative    Influenza virus B, PCR POSITIVE (A) Negative   URINALYSIS CULTURE, IF INDICATED   Result Value Ref Range    Color Yellow     Character Clear  "    Specific Gravity 1.028 <1.035    Ph 6.0 5.0 - 8.0    Glucose Negative Negative mg/dL    Ketones Trace (A) Negative mg/dL    Protein Negative Negative mg/dL    Bilirubin Negative Negative    Urobilinogen, Urine 1.0 Negative    Nitrite Negative Negative    Leukocyte Esterase Negative Negative    Occult Blood Negative Negative    Micro Urine Req see below    Group A Strep by PCR   Result Value Ref Range    Group A Strep by PCR Not Detected Not Detected       RADIOLOGY  DX-CHEST-2 VIEWS   Final Result      No radiographic evidence of acute cardiopulmonary process.          COURSE & MEDICAL DECISION MAKING  Pertinent Labs & Imaging studies reviewed. (See chart for details)  5:15 PM  Patient arrives for evaluation of what appears to be a respiratory illness most likely related to influenza B.  He has no convincing findings to suggest the need for antibiotics at this time and I do not feel labs or imaging will benefit him.  He has excellent pulses and perfusion bilaterally and does not appear to be in any distress, respiratory or otherwise.  Unfortunately, the patient is out of the 48-hour window for treatment with Tamiflu and I do not think it will benefit him at this point.  The patient does have known asthma and is taking his home medications.  He will continue those and follow-up with his outpatient provider.  He will return if his symptoms worsen or change in any way    FINAL IMPRESSION  1. Influenza B        Electronically signed by: Jake Garcia, 11/22/2019 4:54 PM

## 2019-11-23 NOTE — ED NOTES
Lab called with critical result of positive flu B at 1802. Critical lab result read back.   Dr. Garcia notified of critical lab result at 1802.  Critical lab result read back.

## 2023-03-08 ENCOUNTER — OFFICE VISIT (OUTPATIENT)
Dept: URGENT CARE | Facility: CLINIC | Age: 38
End: 2023-03-08
Payer: MEDICAID

## 2023-03-08 VITALS
WEIGHT: 170.4 LBS | RESPIRATION RATE: 16 BRPM | HEART RATE: 82 BPM | DIASTOLIC BLOOD PRESSURE: 78 MMHG | HEIGHT: 66 IN | SYSTOLIC BLOOD PRESSURE: 120 MMHG | OXYGEN SATURATION: 96 % | TEMPERATURE: 98.2 F | BODY MASS INDEX: 27.38 KG/M2

## 2023-03-08 DIAGNOSIS — M54.2 NECK PAIN: ICD-10-CM

## 2023-03-08 DIAGNOSIS — M25.511 ACUTE PAIN OF RIGHT SHOULDER: ICD-10-CM

## 2023-03-08 PROCEDURE — 99204 OFFICE O/P NEW MOD 45 MIN: CPT | Performed by: NURSE PRACTITIONER

## 2023-03-08 RX ORDER — NAPROXEN 500 MG/1
500 TABLET ORAL 2 TIMES DAILY WITH MEALS
Qty: 60 TABLET | Refills: 0 | Status: SHIPPED | OUTPATIENT
Start: 2023-03-08

## 2023-03-08 ASSESSMENT — ENCOUNTER SYMPTOMS
NECK PAIN: 1
SENSORY CHANGE: 1
FOCAL WEAKNESS: 0
TINGLING: 1
BACK PAIN: 1

## 2023-03-08 NOTE — PROGRESS NOTES
Subjective     Bryant Mix is a 37 y.o. male who presents with Shoulder Pain (Shoulder and neck pain with tingling down the right arm x 3 weeks)            HPI  New problem.  Patient is a 37-year-old male who presents with shoulder pain as well as neck pain with numbness and tingling down his right arm x3 weeks.  He denies any recent injury however he does have a remote history of rotator cuff tear, fracture collarbone, and fractured shoulder blade approximately 5 years ago.  The only treatment he got for this was a sling per his report.  He denies any focal weakness.  He has been taking ibuprofen and Tylenol for the symptoms with minimal relief.    Vicodin [hydrocodone-acetaminophen] and Aspirin  No current outpatient medications on file prior to visit.     No current facility-administered medications on file prior to visit.     Social History     Socioeconomic History    Marital status: Legally      Spouse name: Not on file    Number of children: Not on file    Years of education: Not on file    Highest education level: Not on file   Occupational History    Not on file   Tobacco Use    Smoking status: Former     Packs/day: 0.50     Years: 6.00     Pack years: 3.00     Types: Cigarettes    Smokeless tobacco: Current     Types: Chew   Vaping Use    Vaping Use: Former   Substance and Sexual Activity    Alcohol use: Yes     Comment: socially    Drug use: No    Sexual activity: Not Currently     Partners: Female   Other Topics Concern    Not on file   Social History Narrative    Not on file     Social Determinants of Health     Financial Resource Strain: Not on file   Food Insecurity: Not on file   Transportation Needs: Not on file   Physical Activity: Not on file   Stress: Not on file   Social Connections: Not on file   Intimate Partner Violence: Not on file   Housing Stability: Not on file     Breast Cancer-related family history is not on file.      Review of Systems   Musculoskeletal:   "Positive for back pain, joint pain and neck pain.   Skin:  Negative for itching and rash.   Neurological:  Positive for tingling and sensory change. Negative for focal weakness.   All other systems reviewed and are negative.           Objective     /78 (BP Location: Left arm, Patient Position: Sitting, BP Cuff Size: Adult)   Pulse 82   Temp 36.8 °C (98.2 °F) (Temporal)   Resp 16   Ht 1.676 m (5' 6\")   Wt 77.3 kg (170 lb 6.4 oz)   SpO2 96%   BMI 27.50 kg/m²      Physical Exam  Vitals and nursing note reviewed.   Constitutional:       Appearance: Normal appearance.   Cardiovascular:      Rate and Rhythm: Normal rate and regular rhythm.      Heart sounds: No murmur heard.  Pulmonary:      Effort: Pulmonary effort is normal.      Breath sounds: Normal breath sounds.   Musculoskeletal:      Right shoulder: Tenderness present. No bony tenderness or crepitus. Normal range of motion. Normal strength.      Cervical back: Pain with movement and muscular tenderness present. No spinous process tenderness. Decreased range of motion.   Skin:     General: Skin is warm and dry.      Findings: No rash.   Neurological:      General: No focal deficit present.      Mental Status: He is alert and oriented to person, place, and time.   Psychiatric:         Mood and Affect: Mood normal.                           Assessment & Plan        1. Neck pain  Referral to Orthopedics    naproxen (NAPROSYN) 500 MG Tab      2. Acute pain of right shoulder  Referral to Orthopedics    naproxen (NAPROSYN) 500 MG Tab         Imaging deferred today as he will likely have repeat in Ortho and will likely not change anything I can do for him here.  Reviewed plan of care with him and he is agreeable.             "

## 2023-03-08 NOTE — LETTER
March 8, 2023        Bryant Mix  [address] [place] [id number]        Bryant was seen in our clinic today and he is excused from work for today, tomorrow and Friday. He is cleared to return on Monday.  If you have any questions or concerns, please don't hesitate to call.        Sincerely,        ANDERSON Barrera.P.SHANA.    Electronically Signed

## 2023-03-28 ENCOUNTER — NON-PROVIDER VISIT (OUTPATIENT)
Dept: URGENT CARE | Facility: PHYSICIAN GROUP | Age: 38
End: 2023-03-28

## 2023-03-28 DIAGNOSIS — Z02.1 PRE-EMPLOYMENT DRUG SCREENING: Primary | ICD-10-CM

## 2023-03-28 PROCEDURE — 80305 DRUG TEST PRSMV DIR OPT OBS: CPT

## 2023-09-22 NOTE — ED NOTES
Activity  For the rest of the day, do NOT:  Work if you had an epidural steroid injection or IV sedation  Stay alone if you had an epidural steroid injection or IV sedation  Drive a car if you had an epidural steroid injection or IV sedation  Operate electrical/power tools or appliances if you had an epidural steroid injection or IV sedation  Drink alcohol  Sign any legal papers  Apply heat to the injection site    You may:  Apply ice to the injection site for up to 15 minutes at a time for comfort as long as ice is sealed in a water-tight bag so that injection site or dressings do not become wet.  Resume activity as tolerated today  Resume your pre-procedure activity or restrictions tomorrow.    Dressing Care  Keep injection site clean and dry.   You may use an ice pack on and off over the injection site for the next 24 hours while awake.    Bathing  You may shower tomorrow and bathe in two days.    Diet  Resume your normal pre-procedure diet today.  If you are Diabetic and received steroids today, please monitor your blood sugars throughout the day for at least the next 4 days and follow a strict diabetic diet and avoid sugars, and simple carbohydrates such as sweets, candy, regular soda. Focus on Vegetables, proteins and complex carbohydrates.    Medication  Continue home medications, unless otherwise instructed.  If you had an Epidural Steroid injection please do not take NSAIDS or blood thinning medicine for 24 hours (Aspirin, Mobic, Aleve, Ibuprofen, Diclofenac, Plavix, Xarelto, Coumadin, fish oil, ect.)     Follow Up  We will call you tomorrow to review your pain diary entries, evaluate the effectiveness of the procedure, and plan for next steps.      Call  Rocael Ledesma MD office at [phone] if you have the following:  Drainage, increase in redness and/or swelling from the injection site. If there is a dressing/Band-Aid over the injection site, some spotting of the dressings over the injection site  ERP at bedside.    is normal, but drainage that pools, soaks, or drips from the dressing is not normal.  Temperature above 101 degrees, chills, sweats, or body aches.  Numbness or weakness of your legs or arms, different than before the injection.  Severe headache.

## 2023-12-22 ENCOUNTER — OFFICE VISIT (OUTPATIENT)
Dept: URGENT CARE | Facility: CLINIC | Age: 38
End: 2023-12-22
Payer: COMMERCIAL

## 2023-12-22 VITALS
WEIGHT: 175 LBS | TEMPERATURE: 100.5 F | DIASTOLIC BLOOD PRESSURE: 64 MMHG | HEART RATE: 94 BPM | BODY MASS INDEX: 28.12 KG/M2 | SYSTOLIC BLOOD PRESSURE: 122 MMHG | RESPIRATION RATE: 18 BRPM | HEIGHT: 66 IN | OXYGEN SATURATION: 98 %

## 2023-12-22 DIAGNOSIS — U07.1 COVID: ICD-10-CM

## 2023-12-22 LAB
FLUAV RNA SPEC QL NAA+PROBE: NEGATIVE
FLUBV RNA SPEC QL NAA+PROBE: NEGATIVE
RSV RNA SPEC QL NAA+PROBE: NEGATIVE
SARS-COV-2 RNA RESP QL NAA+PROBE: POSITIVE

## 2023-12-22 PROCEDURE — 3078F DIAST BP <80 MM HG: CPT | Performed by: PHYSICIAN ASSISTANT

## 2023-12-22 PROCEDURE — 0241U POCT CEPHEID COV-2, FLU A/B, RSV - PCR: CPT | Performed by: PHYSICIAN ASSISTANT

## 2023-12-22 PROCEDURE — 3074F SYST BP LT 130 MM HG: CPT | Performed by: PHYSICIAN ASSISTANT

## 2023-12-22 PROCEDURE — 99213 OFFICE O/P EST LOW 20 MIN: CPT | Performed by: PHYSICIAN ASSISTANT

## 2023-12-22 ASSESSMENT — ENCOUNTER SYMPTOMS
HEADACHES: 1
SHORTNESS OF BREATH: 0
CHILLS: 1
DIARRHEA: 0
WHEEZING: 0
FEVER: 1
COUGH: 1
NAUSEA: 0
MYALGIAS: 1
SPUTUM PRODUCTION: 0
VOMITING: 0
SORE THROAT: 0

## 2023-12-22 NOTE — PROGRESS NOTES
"Subjective     Bryant Mix is a 38 y.o. male who presents with Headache (Nasal congestion, fatigue, back pain, x 3 days)            Fever   This is a new problem. Episode onset: 2 days. The problem has been waxing and waning. His temperature was unmeasured prior to arrival. Associated symptoms include congestion, coughing, headaches, muscle aches and sleepiness. Pertinent negatives include no chest pain, diarrhea, ear pain, nausea, rash, sore throat, vomiting or wheezing. He has tried nothing for the symptoms.       Past Medical History:   Diagnosis Date    Hypoglycemia     Lactose intolerance     Psychiatric disorder     bipolar    Seizure disorder (HCC)     Spina bifida (HCC)        Past Surgical History:   Procedure Laterality Date    FRENULUM CLIPPING      OTHER      tubes in ears as a child    TONSILLECTOMY           History reviewed. No pertinent family history.      Vicodin [hydrocodone-acetaminophen] and Aspirin      Medications, Allergies, and current problem list reviewed today in Epic      Review of Systems   Constitutional:  Positive for chills, fever and malaise/fatigue.   HENT:  Positive for congestion. Negative for ear pain and sore throat.    Respiratory:  Positive for cough. Negative for sputum production, shortness of breath and wheezing.    Cardiovascular:  Negative for chest pain and leg swelling.   Gastrointestinal:  Negative for diarrhea, nausea and vomiting.   Musculoskeletal:  Positive for myalgias.   Skin:  Negative for rash.   Neurological:  Positive for headaches.     All other systems reviewed and are negative.            Objective     /64   Pulse 94   Temp (!) 38.1 °C (100.5 °F)   Resp 18   Ht 1.676 m (5' 6\")   Wt 79.4 kg (175 lb)   SpO2 98%   BMI 28.25 kg/m²      Physical Exam  Constitutional:       General: He is not in acute distress.     Appearance: He is ill-appearing. He is not toxic-appearing.   HENT:      Head: Normocephalic and atraumatic.      " Mouth/Throat:      Mouth: Mucous membranes are moist.      Pharynx: No posterior oropharyngeal erythema.   Eyes:      Conjunctiva/sclera: Conjunctivae normal.   Cardiovascular:      Rate and Rhythm: Normal rate and regular rhythm.      Heart sounds: Normal heart sounds.   Pulmonary:      Effort: Pulmonary effort is normal. No respiratory distress.      Breath sounds: Normal breath sounds. No wheezing, rhonchi or rales.   Lymphadenopathy:      Cervical: No cervical adenopathy.   Skin:     General: Skin is warm and dry.      Findings: No rash.   Neurological:      General: No focal deficit present.      Mental Status: He is alert and oriented to person, place, and time.   Psychiatric:         Mood and Affect: Mood normal.         Behavior: Behavior normal.         Thought Content: Thought content normal.         Judgment: Judgment normal.                           Assessment & Plan        1. COVID  POCT CEPHEID COV-2, FLU A/B, RSV - PCR    Nirmatrelvir&Ritonavir 300/100 20 x 150 MG & 10 x 100MG Tablet Therapy Pack          - POCT CEPHEID COV-2, FLU A/B, RSV - PCR- positive COVID        Current Outpatient Medications:     Nirmatrelvir&Ritonavir 300/100 20 x 150 MG & 10 x 100MG Tablet Therapy Pack, Take 300 mg nirmatrelvir (two 150 mg tablets) with 100 mg ritonavir (one 100 mg tablet) by mouth, with all three tablets taken together twice daily for 5 days., Disp: 30 Each, Rfl: 0        Differential diagnoses, Supportive care, and indications for immediate follow-up discussed with patient.   Pathogenesis of diagnosis discussed including typical length and natural progression.   Instructed to return to clinic or nearest emergency department for any change in condition, further concerns, or worsening of symptoms.      The patient demonstrated a good understanding and agreed with the treatment plan.    Marimar Domínguez P.A.-C.

## 2023-12-22 NOTE — LETTER
December 22, 2023         Patient: Bryant Mix   YOB: 1985   Date of Visit: 12/22/2023           To Whom it May Concern:    Bryant Mix was seen in my clinic on 12/22/2023. He should be excused from work today for medical illness. He may return to work on 12/26/23 but should wear a mask for the rest of the week.    If you have any questions or concerns, please don't hesitate to call.        Sincerely,           Marimar Domínguez P.A.-C.  Electronically Signed

## 2024-10-06 ENCOUNTER — OFFICE VISIT (OUTPATIENT)
Dept: URGENT CARE | Facility: PHYSICIAN GROUP | Age: 39
End: 2024-10-06
Payer: COMMERCIAL

## 2024-10-06 VITALS
RESPIRATION RATE: 12 BRPM | TEMPERATURE: 98.7 F | DIASTOLIC BLOOD PRESSURE: 84 MMHG | WEIGHT: 162.92 LBS | BODY MASS INDEX: 26.18 KG/M2 | HEART RATE: 78 BPM | HEIGHT: 66 IN | OXYGEN SATURATION: 96 % | SYSTOLIC BLOOD PRESSURE: 124 MMHG

## 2024-10-06 DIAGNOSIS — R68.89 FLU-LIKE SYMPTOMS: ICD-10-CM

## 2024-10-06 LAB
FLUAV RNA SPEC QL NAA+PROBE: NEGATIVE
FLUBV RNA SPEC QL NAA+PROBE: NEGATIVE
RSV RNA SPEC QL NAA+PROBE: NEGATIVE
SARS-COV-2 RNA RESP QL NAA+PROBE: NEGATIVE

## 2024-10-06 PROCEDURE — 99213 OFFICE O/P EST LOW 20 MIN: CPT | Performed by: NURSE PRACTITIONER

## 2024-10-06 PROCEDURE — 3074F SYST BP LT 130 MM HG: CPT | Performed by: NURSE PRACTITIONER

## 2024-10-06 PROCEDURE — 3079F DIAST BP 80-89 MM HG: CPT | Performed by: NURSE PRACTITIONER

## 2024-10-06 PROCEDURE — 0241U POCT CEPHEID COV-2, FLU A/B, RSV - PCR: CPT | Performed by: NURSE PRACTITIONER

## 2025-04-28 ENCOUNTER — NON-PROVIDER VISIT (OUTPATIENT)
Dept: OCCUPATIONAL MEDICINE | Facility: CLINIC | Age: 40
End: 2025-04-28

## 2025-04-28 DIAGNOSIS — Z02.1 PRE-EMPLOYMENT DRUG SCREENING: ICD-10-CM

## 2025-05-12 ENCOUNTER — APPOINTMENT (OUTPATIENT)
Dept: URGENT CARE | Facility: PHYSICIAN GROUP | Age: 40
End: 2025-05-12
Payer: OTHER MISCELLANEOUS

## 2025-05-12 ENCOUNTER — OCCUPATIONAL MEDICINE (OUTPATIENT)
Dept: URGENT CARE | Facility: PHYSICIAN GROUP | Age: 40
End: 2025-05-12
Payer: OTHER MISCELLANEOUS

## 2025-05-12 ENCOUNTER — TELEPHONE (OUTPATIENT)
Dept: URGENT CARE | Facility: PHYSICIAN GROUP | Age: 40
End: 2025-05-12
Payer: COMMERCIAL

## 2025-05-12 VITALS
HEART RATE: 76 BPM | SYSTOLIC BLOOD PRESSURE: 130 MMHG | WEIGHT: 165 LBS | BODY MASS INDEX: 25.9 KG/M2 | RESPIRATION RATE: 18 BRPM | OXYGEN SATURATION: 96 % | DIASTOLIC BLOOD PRESSURE: 76 MMHG | HEIGHT: 67 IN | TEMPERATURE: 98.2 F

## 2025-05-12 DIAGNOSIS — S29.019A THORACIC MYOFASCIAL STRAIN, INITIAL ENCOUNTER: ICD-10-CM

## 2025-05-12 DIAGNOSIS — S39.012A STRAIN OF LUMBAR REGION, INITIAL ENCOUNTER: ICD-10-CM

## 2025-05-12 PROCEDURE — 3075F SYST BP GE 130 - 139MM HG: CPT | Performed by: STUDENT IN AN ORGANIZED HEALTH CARE EDUCATION/TRAINING PROGRAM

## 2025-05-12 PROCEDURE — 3078F DIAST BP <80 MM HG: CPT | Performed by: STUDENT IN AN ORGANIZED HEALTH CARE EDUCATION/TRAINING PROGRAM

## 2025-05-12 PROCEDURE — 99213 OFFICE O/P EST LOW 20 MIN: CPT | Performed by: STUDENT IN AN ORGANIZED HEALTH CARE EDUCATION/TRAINING PROGRAM

## 2025-05-12 NOTE — LETTER
PHYSICIAN’S AND CHIROPRACTIC PHYSICIAN'S   PROGRESS REPORT   CERTIFICATION OF DISABILITY Claim Number:     Social Security Number: [id number]   Patient’s Name: Bryant Mix Date of Injury: 5/9/2025   Employer: [employer] Name of O (if applicable):      Patient’s Job Description/Occupation: [employer]       Previous Injuries/Diseases/Surgeries Contributing to the Condition:  The patient presents for evaluation of back pain.    He reports experiencing back pain, which he attributes to a work-related incident on Friday (5/9/25)  involving the lifting of a 50-pound bag. He believes he may have twisted too quickly to the right, resulting in persistent left-sided back pain for the past 3 days. The pain is exacerbated by certain movements, particularly those that involve bending backwards or forwards. He points to the left flank and upper thoracic region as source of discomfort.    He reports no urinary or fecal incontinence since the onset of the pain. He has a history of spina bifida and degenerative disc disease, which have previously caused numbness, but he reports no worsening of these symptoms.  No current radiculopathy or saddle anesthesia. He has been managing his current symptoms with Tylenol, which provides some relief. He reports no hematuria or dysuria.      Diagnosis: (S39.012A) Strain of lumbar region, initial encounter  (S29.019A) Thoracic myofascial strain, initial encounter      Related to the Industrial Injury? Yes     Explain:        Objective Medical Findings: Gen: no acute distress, normal voice  Skin: dry, intact, moist mucosal membranes  Head: Atraumatic, normocephalic  Psych: normal affect, normal judgement, alert, awake  Musculoskeletal: Thoracic and lumbar spine: Full range of motion with flexion and extension with mild discernible discomfort with terminal extension.  No erythema, edema or gross abnormalities of the posterior torso.  No focal bony or soft tissue tenderness to palpation.  No  motor or sensory deficits with good perfusion of soft tissue.               None - Discharged                         Stable  No                 Ratable  No        Generally Improved                         Condition Worsened                  Condition Same  May Have Suffered a Permanent Disability No     Treatment Plan:    History/exam consistent with muscle strain.  -Provided home stretches and exercises which I personally reviewed and discussed with the patient  -Ibuprofen 800 mg every 8 hours as needed for symptomatic relief  -Light activity as tolerated  -10 pound weight limit while at work  -Follow-up in 1 week for reevaluation         No Change in Therapy                  PT/OT Prescribed                      Medication May be Used While Working        Case Management                          PT/OT Discontinued    Consultation    Further Diagnostic Studies:    Prescription(s)                 Released to FULL DUTY /No Restrictions on (Date):       Certified TOTALLY TEMPORARILY DISABLED (Indicate Dates) From:   To:    X  Released to RESTRICTED/Modified Duty on (Date): From:   To:    Restrictions Are:         No Sitting    No Standing    No Pulling Other:         No Bending at Waist     No Stooping     No Lifting        No Carrying     No Walking Lifting Restricted to (lbs.):  < or = to 10 pounds       No Pushing        No Climbing     No Reaching Above Shoulders       Date of Next Visit:  5/19/2025 Date of this Exam: 5/12/2025 Physician/Chiropractic Physician Name: Nick Gomez D.O. Physician/Chiropractic Physician Signature:  Justin Wakefield DO MPH     [place]:  [address] [place] [address], [address] [place], [place] [address] - Telephone [phone] [place]:  [address] [address] [facility], [address] [place], [place] [address] - Telephone [phone]    [email or web address]  D-39 (Rev. 10/24)

## 2025-05-12 NOTE — LETTER
This is a 5 6 [facility] URG[facility]ARE [address] [address]     May 12, 2025    Patient: Bryant Mix   YOB: 1985   Date of Visit: 5/12/2025       To Whom It May Concern:    Bryant Mix was seen and treated in our department on 5/12/2025. The patient presents for evaluation of back pain.    He reports experiencing back pain, which he attributes to a work-related incident on Friday (5/9/25)  involving the lifting of a 50-pound bag. He believes he may have twisted too quickly to the right, resulting in persistent left-sided back pain for the past 3 days. The pain is exacerbated by certain movements, particularly those that involve bending backwards or forwards. He points to the left flank and upper thoracic region as source of discomfort.    He reports no urinary or fecal incontinence since the onset of the pain. He has a history of spina bifida and degenerative disc disease, which have previously caused numbness, but he reports no worsening of these symptoms.  No current radiculopathy or saddle anesthesia. He has been managing his current symptoms with Tylenol, which provides some relief. He reports no hematuria or dysuria.  She feels    Sincerely,     Nick Gomez D.O.

## 2025-05-12 NOTE — LETTER
"  EMPLOYEE’S CLAIM FOR COMPENSATION/ REPORT OF INITIAL TREATMENT  FORM C-4  PLEASE TYPE OR PRINT    EMPLOYEE’S CLAIM - PROVIDE ALL INFORMATION REQUESTED   First Name                    MAYRA Hurtado                  Last Name  Dominguez Birthdate                    1985                Sex  [x]Male Claim Number (Insurer’s Use Only)     Mailing Address  [address] [facility] [address] Age  39 y.o. Height  1.702 m (5' 7\") Weight  74.8 kg (165 lb) Social Security Number     [facility] Zip  [address] Telephone  There are no phone numbers on file.   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   [employer] Workers Compensation - Tpa   Employee's Occupation (Job Title) When Injury or Occupational Disease Occurred  [employer]    Employer's Name/Company Name  [employer]  Telephone  [phone]    Office Mail Address (Number and Street)  [address]     Date of Injury (if applicable) 5/9/2025               Hours Injury (if applicable)  1:15 PM am    pm Date Employer Notified  5/12/2025 Last Day of Work after Injury or Occupational Disease  5/12/2025 Supervisor to Whom Injury Reported  N/A   Address or Location of Accident (if applicable)  Work [1]   What were you doing at the time of accident? (if applicable)  N/A    How did this injury or occupational disease occur? (Be specific and answer in detail. Use additional sheet if necessary)   a 50 lbs bag of wheat quiten eurned felt a pop in my back last 3 days sharp pain pn left side of my back   If you believe that you have an occupational disease, when did you first have knowledge of the disability and its relationship to your employment?  N/A Witnesses to the Accident (if applicable)  told coworker Quintin about it      Nature of Injury or Occupational Disease  Workers' Compensation  Part(s) of Body " Injured or Affected  Lower Back Area (Lumbar Area & Lumbo-Sacral) N/A N/A    I CERTIFY THAT THE ABOVE IS TRUE AND CORRECT TO T HE BEST OF MY KNOWLEDGE AND THAT I HAVE PROVIDED THIS INFORMATION IN ORDER TO OBTAIN THE BENEFITS OF [place]CC[place], OR CHAPTER 617 [place]).  I HEREBY AUTHORIZE ANY PHYSICIAN, CHIROPRACTOR, SURGEON, PRACTITIONER OR ANY OTHER PERSON, ANY HOSPITAL, INCLUDING [facility] OR [facility], ANY  MEDICAL SERVICE ORGANIZATION, ANY INSURANCE COMPANY, OR OTHER INSTITUTION OR ORGANIZATION TO RELEASE TO EACH OTHER, ANY MEDICAL OR OTHER INFORMATION, INCLUDING BENEFITS PAID OR PAYABLE, PERTINENT TO THIS INJURY OR DISEASE, EXCEPT INFORMATION RELATIVE TO DIAGNOSIS, TREATMENT AND/OR COUNSELING FOR AIDS, PSYCHOLOGICAL CONDITIONS, ALCOHOL OR CONTROLLED SUBSTANCES, FOR WHICH I MUST GIVE SPECIFIC AUTHORIZATION.  A PHOTOSTAT OF THIS AUTHORIZATION SHALL BE VALID AS THE ORIGINAL.     Date   Place Employee’s Original or  *Electronic Signature   THIS REPORT MUST BE COMPLETED AND MAILED WITHIN 3 WORKING DAYS OF TREATMENT   Place  [facility] URGENT CARE VIS    Name of Facility  [place]   Date 5/12/2025 Diagnosis and Description of Injury or Occupational Disease  (S39.012A) Strain of lumbar region, initial encounter  (S29.019A) Thoracic myofascial strain, initial encounter  Diagnoses of Strain of lumbar region, initial encounter and Thoracic myofascial strain, initial encounter were pertinent to this visit. Is there evidence that the injured employee was under the influence of alcohol and/or another controlled substance at the time of accident?  []No  [] Yes (if yes, please explain)   Hour 12:41 PM  No   Treatment: History/exam consistent with muscle strain.  -Provided home stretches and exercises which I personally reviewed and discussed with the patient  -Ibuprofen 800 mg every 8 hours as needed for symptomatic relief  -Light  activity as tolerated  -10 pound weight limit while at work  -Follow-up in 1 week for reevaluation      Have you advised the patient to remain off work five days or more?   No  [] Yes  If yes, indicate dates: From_ _                                                      To __ _  [] No   If no, is the injured employee capable of: [] full duty No   [] modified duty      If modified duty, specify any limitations / restrictions:__________________  ___10 pound weight limit___________________________     X-Ray Findings:      From information given by the employee, together with medical evidence, can you directly connect this injury or occupational disease as job incurred?  []Yes   [] No Yes    Is additional medical care by a physician indicated? []Yes [] No  Yes    Do you know of any previous injury or disease contributing to this condition or occupational disease? []Yes [] No (Explain if yes)                          Yes  Comments:History of chronic lumbar pain   Date  5/12/2025 Print Health Care Provider’s Name  Nick Gomez D.O. I certify that the employer’s copy of  this form was delivered to the employer on:   Address  [address] INSURER'S USE ONLY                       [facility] Zip  [address] Provider’s Tax ID Number  [id number]   Telephone  Dept: [phone]    Health Care Provider’s Original or Electronic Signature      e-NICK Ferrer D.O.    Degree (MD,DO, DC,PA-C,APRN)  DO  Choose (if applicable)      ORIGINAL - TREATING HEALTHCARE PROVIDER PAGE 2 - INSURER/TPA PAGE 3 - EMPLOYER PAGE 4 - EMPLOYEE             Form C-4 (rev.02/25)

## 2025-05-12 NOTE — LETTER
PHYSICIAN’S AND CHIROPRACTIC PHYSICIAN'S   PROGRESS REPORT   CERTIFICATION OF DISABILITY Claim Number:     Social Security Number:    Patient’s Name: Bryant Mix Date of Injury: 5/9/2025   Employer: [employer] Name of O (if applicable):      Patient’s Job Description/Occupation: [employer]       Previous Injuries/Diseases/Surgeries Contributing to the Condition:  The patient presents for evaluation of back pain.    He reports experiencing back pain, which he attributes to a work-related incident on Friday (5/9/25)  involving the lifting of a 50-pound bag. He believes he may have twisted too quickly to the right, resulting in persistent left-sided back pain for the past 3 days. The pain is exacerbated by certain movements, particularly those that involve bending backwards or forwards. He points to the left flank and upper thoracic region as source of discomfort.    He reports no urinary or fecal incontinence since the onset of the pain. He has a history of spina bifida and degenerative disc disease, which have previously caused numbness, but he reports no worsening of these symptoms.  No current radiculopathy or saddle anesthesia. He has been managing his current symptoms with Tylenol, which provides some relief. He reports no hematuria or dysuria.      Diagnosis: (S39.012A) Strain of lumbar region, initial encounter  (S29.019A) Thoracic myofascial strain, initial encounter      Related to the Industrial Injury? Yes     Explain:        Objective Medical Findings: Gen: no acute distress, normal voice  Skin: dry, intact, moist mucosal membranes  Head: Atraumatic, normocephalic  Psych: normal affect, normal judgement, alert, awake  Musculoskeletal: Thoracic and lumbar spine: Full range of motion with flexion and extension with mild discernible discomfort with terminal extension.  No erythema, edema or gross abnormalities of the posterior torso.  No focal bony or soft tissue tenderness to palpation.  No  motor or sensory deficits with good perfusion of soft tissue.               None - Discharged                         Stable  No                 Ratable  No        Generally Improved                         Condition Worsened                  Condition Same  May Have Suffered a Permanent Disability No     Treatment Plan:    History/exam consistent with muscle strain.  -Provided home stretches and exercises which I personally reviewed and discussed with the patient  -Ibuprofen 800 mg every 8 hours as needed for symptomatic relief  -Light activity as tolerated  -10 pound weight limit while at work  -Follow-up in 1 week for reevaluation         No Change in Therapy                  PT/OT Prescribed                      Medication May be Used While Working        Case Management                          PT/OT Discontinued    Consultation    Further Diagnostic Studies:    Prescription(s)                 Released to FULL DUTY /No Restrictions on (Date):       Certified TOTALLY TEMPORARILY DISABLED (Indicate Dates) From:   To:    X  Released to RESTRICTED/Modified Duty on (Date): From: 5/12/2025 To: 5/19/2025  Restrictions Are:         No Sitting    No Standing    No Pulling Other:         No Bending at Waist     No Stooping     No Lifting        No Carrying     No Walking Lifting Restricted to (lbs.):  < or = to 10 pounds       No Pushing        No Climbing     No Reaching Above Shoulders       Date of Next Visit:  5/19/2025 Date of this Exam: 5/12/2025 Physician/Chiropractic Physician Name: Nick Gomez D.O. Physician/Chiropractic Physician Signature:  Justin Wakefield DO MPH     [place]:  [address] [place] [address], [address] [place], [place] [address] - Telephone [phone] [place]:  [address] [place] [address], [address] [place], [place] [address] - Telephone [phone]    [email or web address]  D-39 (Rev. 10/24)

## 2025-05-12 NOTE — LETTER
PHYSICIAN’S AND CHIROPRACTIC PHYSICIAN'S   PROGRESS REPORT   CERTIFICATION OF DISABILITY Claim Number:     Social Security Number: [id number]   Patient’s Name: Bryant Mix Date of Injury: 5/9/2025   Employer: [employer] Name of O (if applicable):      Patient’s Job Description/Occupation: [employer]       Previous Injuries/Diseases/Surgeries Contributing to the Condition:  The patient presents for evaluation of back pain.    He reports experiencing back pain, which he attributes to a work-related incident on Friday (5/9/25)  involving the lifting of a 50-pound bag. He believes he may have twisted too quickly to the right, resulting in persistent left-sided back pain for the past 3 days. The pain is exacerbated by certain movements, particularly those that involve bending backwards or forwards. He points to the left flank and upper thoracic region as source of discomfort.    He reports no urinary or fecal incontinence since the onset of the pain. He has a history of spina bifida and degenerative disc disease, which have previously caused numbness, but he reports no worsening of these symptoms.  No current radiculopathy or saddle anesthesia. He has been managing his current symptoms with Tylenol, which provides some relief. He reports no hematuria or dysuria.      Diagnosis: (S39.012A) Strain of lumbar region, initial encounter  (S29.019A) Thoracic myofascial strain, initial encounter      Related to the Industrial Injury? Yes     Explain:        Objective Medical Findings: Gen: no acute distress, normal voice  Skin: dry, intact, moist mucosal membranes  Head: Atraumatic, normocephalic  Psych: normal affect, normal judgement, alert, awake  Musculoskeletal: Thoracic and lumbar spine: Full range of motion with flexion and extension with mild discernible discomfort with terminal extension.  No erythema, edema or gross abnormalities of the posterior torso.  No focal bony or soft tissue tenderness to palpation.  No  motor or sensory deficits with good perfusion of soft tissue.               None - Discharged                         Stable  No                 Ratable  No        Generally Improved                         Condition Worsened                  Condition Same  May Have Suffered a Permanent Disability No     Treatment Plan:    History/exam consistent with muscle strain.  -Provided home stretches and exercises which I personally reviewed and discussed with the patient  -Ibuprofen 800 mg every 8 hours as needed for symptomatic relief  -Light activity as tolerated  -10 pound weight limit while at work  -Follow-up in 1 week for reevaluation         No Change in Therapy                  PT/OT Prescribed                      Medication May be Used While Working        Case Management                          PT/OT Discontinued    Consultation    Further Diagnostic Studies:    Prescription(s)                 Released to FULL DUTY /No Restrictions on (Date):       Certified TOTALLY TEMPORARILY DISABLED (Indicate Dates) From:   To:    X  Released to RESTRICTED/Modified Duty on (Date): From: 5/12/2025 To: 5/19/2025  Restrictions Are:         No Sitting    No Standing    No Pulling Other:         No Bending at Waist     No Stooping     No Lifting        No Carrying     No Walking Lifting Restricted to (lbs.):  < or = to 10 pounds       No Pushing        No Climbing     No Reaching Above Shoulders       Date of Next Visit:  5/19/2025 Date of this Exam: 5/12/2025 Physician/Chiropractic Physician Name: Nick Gomez D.O. Physician/Chiropractic Physician Signature:  Justin Wakefield DO MPH     [place]:  [address] [place] [address], [address] [place], [place] [address] - Telephone [phone] [place]:  [address] [place] [address], [address] [place], [place] [address] - Telephone [phone]    [email or web address]  D-39 (Rev. 10/24)

## 2025-05-12 NOTE — LETTER
PHYSICIAN’S AND CHIROPRACTIC PHYSICIAN'S   PROGRESS REPORT   CERTIFICATION OF DISABILITY Claim Number:     Social Security Number:    Patient’s Name: Bryant Mix Date of Injury: 5/9/2025   Employer: [employer] Name of O (if applicable):      Patient’s Job Description/Occupation: [employer]       Previous Injuries/Diseases/Surgeries Contributing to the Condition:  The patient presents for evaluation of back pain.    He reports experiencing back pain, which he attributes to a work-related incident on Friday (5/9/25)  involving the lifting of a 50-pound bag. He believes he may have twisted too quickly to the right, resulting in persistent left-sided back pain for the past 3 days. The pain is exacerbated by certain movements, particularly those that involve bending backwards or forwards. He points to the left flank and upper thoracic region as source of discomfort.    He reports no urinary or fecal incontinence since the onset of the pain. He has a history of spina bifida and degenerative disc disease, which have previously caused numbness, but he reports no worsening of these symptoms.  No current radiculopathy or saddle anesthesia. He has been managing his current symptoms with Tylenol, which provides some relief. He reports no hematuria or dysuria.      Diagnosis: (S39.012A) Strain of lumbar region, initial encounter  (S29.019A) Thoracic myofascial strain, initial encounter      Related to the Industrial Injury? Yes     Explain:        Objective Medical Findings: Gen: no acute distress, normal voice  Skin: dry, intact, moist mucosal membranes  Head: Atraumatic, normocephalic  Psych: normal affect, normal judgement, alert, awake  Musculoskeletal: Thoracic and lumbar spine: Full range of motion with flexion and extension with mild discernible discomfort with terminal extension.  No erythema, edema or gross abnormalities of the posterior torso.  No focal bony or soft tissue tenderness to palpation.  No  motor or sensory deficits with good perfusion of soft tissue.               None - Discharged                         Stable  No                 Ratable  No        Generally Improved                         Condition Worsened                  Condition Same  May Have Suffered a Permanent Disability No     Treatment Plan:    History/exam consistent with muscle strain.  -Provided home stretches and exercises which I personally reviewed and discussed with the patient  -Ibuprofen 800 mg every 8 hours as needed for symptomatic relief  -Light activity as tolerated  -10 pound weight limit while at work  -Follow-up in 1 week for reevaluation         No Change in Therapy                  PT/OT Prescribed                      Medication May be Used While Working        Case Management                          PT/OT Discontinued    Consultation    Further Diagnostic Studies:    Prescription(s)                 Released to FULL DUTY /No Restrictions on (Date):       Certified TOTALLY TEMPORARILY DISABLED (Indicate Dates) From:   To:    X  Released to RESTRICTED/Modified Duty on (Date): From:   To:    Restrictions Are:         No Sitting    No Standing    No Pulling Other:         No Bending at Waist     No Stooping     No Lifting        No Carrying     No Walking Lifting Restricted to (lbs.):  < or = to 10 pounds       No Pushing        No Climbing     No Reaching Above Shoulders       Date of Next Visit:  5/19/2025 Date of this Exam: 5/12/2025 Physician/Chiropractic Physician Name: Nick Gomez D.O. Physician/Chiropractic Physician Signature:  Justin Wakefield DO MPH     [place]:  [address] [place] [address], [address] [place], [place] [address] - Telephone [phone] [place]:  [address]  [facility], [address] [place], [place] [address] - Telephone [phone]    [email or web address]  D-39 (Rev. 10/24)

## 2025-05-12 NOTE — LETTER
[facility] [address] [address]     May 12, 2025    Patient: Bryant Mix   YOB: 1985   Date of Visit: 5/12/2025       To Whom It May Concern:    Bryant Mix was seen and treated in our department on 5/12/2025.  He is excused from work today.    Sincerely,     Nick Gomez D.O.

## 2025-05-12 NOTE — TELEPHONE ENCOUNTER
Called and spoke to LUCRETIA Mosqueda, per Vanesa no UDS or BAT is needed due to injury occurring 5/9/25.

## 2025-05-12 NOTE — PROGRESS NOTES
"Subjective:     Bryant Mix is a 39 y.o. male who presents for Back Pain (New WC back pain was lifting something turned and heard a pop )      History of Present Illness  The patient presents for evaluation of back pain.    He reports experiencing back pain, which he attributes to a work-related incident on Friday (5/9/25)  involving the lifting of a 50-pound bag. He believes he may have twisted too quickly to the right, resulting in persistent left-sided back pain for the past 3 days. The pain is exacerbated by certain movements, particularly those that involve bending backwards or forwards. He points to the left flank and upper thoracic region as source of discomfort.    He reports no urinary or fecal incontinence since the onset of the pain. He has a history of spina bifida and degenerative disc disease, which have previously caused numbness, but he reports no worsening of these symptoms.  No current radiculopathy or saddle anesthesia. He has been managing his current symptoms with Tylenol, which provides some relief. He reports no hematuria or dysuria.        PMH:   No pertinent past medical history to this problem  MEDS:  Medications were reviewed in EMR  ALLERGIES:  Allergies were reviewed in EMR    FH:   No pertinent family history to this problem       Objective:     /76   Pulse 76   Temp 36.8 °C (98.2 °F)   Resp 18   Ht 1.702 m (5' 7\")   Wt 74.8 kg (165 lb)   SpO2 96%   BMI 25.84 kg/m²     Gen: no acute distress, normal voice  Skin: dry, intact, moist mucosal membranes  Head: Atraumatic, normocephalic  Psych: normal affect, normal judgement, alert, awake  Musculoskeletal: Thoracic and lumbar spine: Full range of motion with flexion and extension with mild discernible discomfort with terminal extension.  No erythema, edema or gross abnormalities of the posterior torso.  No focal bony or soft tissue tenderness to palpation.  No motor or sensory deficits with good perfusion of soft " tissue.          Assessment/Plan:       1. Strain of lumbar region, initial encounter    2. Thoracic myofascial strain, initial encounter      FROM   TO            History/exam consistent with muscle strain.  -Provided home stretches and exercises which I personally reviewed and discussed with the patient  -Ibuprofen 800 mg every 8 hours as needed for symptomatic relief  -Light activity as tolerated  -10 pound weight limit while at work  -Follow-up in 1 week for reevaluation

## 2025-05-19 ENCOUNTER — OCCUPATIONAL MEDICINE (OUTPATIENT)
Dept: URGENT CARE | Facility: PHYSICIAN GROUP | Age: 40
End: 2025-05-19
Payer: OTHER MISCELLANEOUS

## 2025-05-19 VITALS
HEART RATE: 70 BPM | BODY MASS INDEX: 26.37 KG/M2 | DIASTOLIC BLOOD PRESSURE: 76 MMHG | OXYGEN SATURATION: 97 % | WEIGHT: 168 LBS | RESPIRATION RATE: 18 BRPM | HEIGHT: 67 IN | SYSTOLIC BLOOD PRESSURE: 126 MMHG | TEMPERATURE: 98.7 F

## 2025-05-19 DIAGNOSIS — S39.012D STRAIN OF LUMBAR REGION, SUBSEQUENT ENCOUNTER: Primary | ICD-10-CM

## 2025-05-19 PROCEDURE — 99212 OFFICE O/P EST SF 10 MIN: CPT | Mod: 29 | Performed by: PHYSICIAN ASSISTANT

## 2025-05-19 NOTE — LETTER
PHYSICIAN’S AND CHIROPRACTIC PHYSICIAN'S   PROGRESS REPORT   CERTIFICATION OF DISABILITY Claim Number:     Social Security Number:    Patient’s Name: Bryant Mix Date of Injury:    Employer: [employer] Name of MCO (if applicable):      Patient’s Job Description/Occupation:        Previous Injuries/Diseases/Surgeries Contributing to the Condition:  History of spinal bifida although the above symptoms are consistent with work-related injury.      Diagnosis: (S39.808D) Strain of lumbar region, subsequent encounter  (primary encounter diagnosis)      Related to the Industrial Injury? Yes     Explain: DOI:  5/29/25He reports experiencing back pain, which he attributes to a work-related incident on Friday (5/9/25)  involving the lifting of a 50-pound bag. He believes he may have twisted too quickly to the right, resulting in persistent left-sided back pain for the past 3 days.  Visit #2 today after 1 week of light duty-currently denies any discomfort and reports that he feels he is ready for full duty.  Currently denies any pain has not needed to take over-the-counter pain medication in a few days.      Objective Medical Findings: Spine- without midline tenderness, step-off. Without noted paraspinous spasm.  Unable to elicit tenderness with palpation.  FROM with lateral bend, and flexion/extension. Without noted tenderness over the sacroiliac notches. Sensation intact bilaterally, BLE motor 5/5 and symmetrical  strength.Gait- WNL without foot drop.            None - Discharged                         Stable  No                 Ratable  No     X   Generally Improved                         Condition Worsened                  Condition Same  May Have Suffered a Permanent Disability No     Treatment Plan:    Trial full duty for 1 week return to clinic sooner for worsening symptoms.  Recheck in clinic-in 1 week.         No Change in Therapy                  PT/OT Prescribed                      Medication May  be Used While Working        Case Management                          PT/OT Discontinued    Consultation    Further Diagnostic Studies:    Prescription(s)               X  Released to FULL DUTY /No Restrictions on (Date):  5/19/2025    Certified TOTALLY TEMPORARILY DISABLED (Indicate Dates) From:   To:      Released to RESTRICTED/Modified Duty on (Date): From:   To:    Restrictions Are:         No Sitting    No Standing    No Pulling Other:         No Bending at Waist     No Stooping     No Lifting        No Carrying     No Walking Lifting Restricted to (lbs.):          No Pushing        No Climbing     No Reaching Above Shoulders       Date of Next Visit:  5/26/2025 Date of this Exam: 5/19/2025 Physician/Chiropractic Physician Name: Jairo Reyes P.A.-C. Physician/Chiropractic Physician Signature:  Justin Wakefield DO MPH     [place]:  [address] [place] [address], [address] [place], [place] [address] - Telephone [phone] [place]:  [address] [place] [address], [address] [place], [place] [address] - Telephone [phone]    [email or web address]  D-39 (Rev. 10/24)

## 2025-05-19 NOTE — PROGRESS NOTES
"Subjective:   Bryant Mix is a 39 y.o. male who presents for Back Pain ([facility])      Date of Injury:    Industrial Injury: Yes , Comments: DOI:  5/29/25He reports experiencing back pain, which he attributes to a work-related incident on Friday (5/9/25)  involving the lifting of a 50-pound bag. He believes he may have twisted too quickly to the right, resulting in persistent left-sided back pain for the past 3 days.  Visit #2 today after 1 week of light duty-currently denies any discomfort and reports that he feels he is ready for full duty.  Currently denies any pain has not needed to take over-the-counter pain medication in a few days.   Previous Injuries/Diseases/Surgeries Contributing to the Condition: History of spinal bifida although the above symptoms are consistent with work-related injury.    Patient continues to deny any red flag symptoms of bowel or bladder changes, numbness or tingling down the legs, leg weakness, saddle anesthesias, radicular symptoms.    PMH:   Reviewed, see above  MEDS:  Medications were reviewed in EMR  ALLERGIES:  Allergies were reviewed in EMR  [employer]:  Works as a    FH:   No pertinent family history to this problem     Objective:   /76   Pulse 70   Temp 37.1 °C (98.7 °F)   Resp 18   Ht 1.702 m (5' 7\")   Wt 76.2 kg (168 lb)   SpO2 97%   BMI 26.31 kg/m²     Spine- without midline tenderness, step-off. Without noted paraspinous spasm.  Unable to elicit tenderness with palpation.  FROM with lateral bend, and flexion/extension. Without noted tenderness over the sacroiliac notches. Sensation intact bilaterally, BLE motor 5/5 and symmetrical  strength.Gait- WNL without foot drop.       Assessment/Plan:     1. Strain of lumbar region, subsequent encounter      Full duty for 1 week.   Treatment plan comments: Trial full duty for 1 week return to clinic sooner for worsening symptoms.  Recheck in clinic-in 1 week.     Differential diagnosis, natural history, " supportive care, and indications for immediate follow-up discussed.    Jairo Reyes P.A.-C.

## 2025-05-19 NOTE — Clinical Note
"  EMPLOYEE’S CLAIM FOR COMPENSATION/ REPORT OF INITIAL TREATMENT  FORM C-4  PLEASE TYPE OR PRINT    EMPLOYEE’S CLAIM - PROVIDE ALL INFORMATION REQUESTED   First Name                    MARYA Hurtado                  Last Name  Dominguez Birthdate                    1985                Sex  [x]Male Claim Number (Insurer’s Use Only)     Mailing Address  [address] [facility] [facility] [address] Age  39 y.o. Height  1.702 m (5' 7\") Weight  76.2 kg (168 lb) Social Security Number     [facility] Zip  [address] Telephone  There are no phone numbers on file.   Email  [email or web address]    Primary Language Spoken  English    INSURER  *** THIRD-PARTY   [employer] Workers Compensation - Tpa   Employee's Occupation (Job Title) When Injury or Occupational Disease Occurred      Employer's Name/Company Name  [employer]  Telephone  [phone]    Office Mail Address (Number and Street)  [address]     Date of Injury (if applicable)                Hours Injury (if applicable)   am    pm Date Employer Notified   Last Day of Work after Injury or Occupational Disease   Supervisor to Whom Injury Reported     Address or Location of Accident (if applicable)     What were you doing at the time of accident? (if applicable)      How did this injury or occupational disease occur? (Be specific and answer in detail. Use additional sheet if necessary)     If you believe that you have an occupational disease, when did you first have knowledge of the disability and its relationship to your employment?   Witnesses to the Accident (if applicable)        Nature of Injury or Occupational Disease    Part(s) of Body Injured or Affected        I CERTIFY THAT THE ABOVE IS TRUE AND CORRECT TO T HE BEST OF MY KNOWLEDGE AND THAT I HAVE PROVIDED THIS INFORMATION IN ORDER TO OBTAIN THE BENEFITS OF [place]CC[place] " [place], OR CHAPTER [place]).  I HEREBY AUTHORIZE ANY PHYSICIAN, CHIROPRACTOR, SURGEON, PRACTITIONER OR ANY OTHER PERSON, ANY HOSPITAL, INCLUDING [facility], ANY  MEDICAL SERVICE ORGANIZATION, ANY INSURANCE COMPANY, OR OTHER INSTITUTION OR ORGANIZATION TO RELEASE TO EACH OTHER, ANY MEDICAL OR OTHER INFORMATION, INCLUDING BENEFITS PAID OR PAYABLE, PERTINENT TO THIS INJURY OR DISEASE, EXCEPT INFORMATION RELATIVE TO DIAGNOSIS, TREATMENT AND/OR COUNSELING FOR AIDS, PSYCHOLOGICAL CONDITIONS, ALCOHOL OR CONTROLLED SUBSTANCES, FOR WHICH I MUST GIVE SPECIFIC AUTHORIZATION.  A PHOTOSTAT OF THIS AUTHORIZATION SHALL BE VALID AS THE ORIGINAL.     Date   Place Employee’s Original or  *Electronic Signature   THIS REPORT MUST BE COMPLETED AND MAILED WITHIN 3 WORKING DAYS OF TREATMENT   Place  [facility] [place]    Name of Facility  [facility]   Date 5/19/2025 Diagnosis and Description of Injury or Occupational Disease  (S39.012D) Strain of lumbar region, subsequent encounter  (primary encounter diagnosis)  The encounter diagnosis was Strain of lumbar region, subsequent encounter. Is there evidence that the injured employee was under the influence of alcohol and/or another controlled substance at the time of accident?  []No  [] Yes (if yes, please explain)   Hour 1:18 PM      Treatment:      Have you advised the patient to remain off work five days or more?      [] Yes  If yes, indicate dates: From_ _                                                      To __ _  [] No   If no, is the injured employee capable of: [] full duty     [] modified duty      If modified duty, specify any limitations / restrictions:__________________  ___ ___________________________     X-Ray Findings:      From information given by the employee, together with medical evidence, can you directly connect this injury or occupational disease as job incurred?  []Yes   [] No      Is  additional medical care by a physician indicated? []Yes [] No       Do you know of any previous injury or disease contributing to this condition or occupational disease? []Yes [] No (Explain if yes)                              Date  5/19/2025 Print Health Care Provider’s Name  Jairo Reyes P.A.-C. I certify that the employer’s copy of  this form was delivered to the employer on:   Address  [address] INSURER'S USE ONLY                       [facility] Zip  [address] Provider’s Tax ID Number  [id number]   Telephone  Dept: [phone]    Health Care Provider’s Original or Electronic Signature      e-JAIRO Matson P.A.-C.    Degree (MD,DO, DC,PA-C,APRN)  {Provider Degrees:[id number]}  Choose (if applicable)      ORIGINAL - TREATING HEALTHCARE PROVIDER PAGE 2 - INSURER/TPA PAGE 3 - EMPLOYER PAGE 4 - EMPLOYEE             Form C-4 (rev.02/25)